# Patient Record
Sex: FEMALE | Race: WHITE | Employment: UNEMPLOYED | URBAN - METROPOLITAN AREA
[De-identification: names, ages, dates, MRNs, and addresses within clinical notes are randomized per-mention and may not be internally consistent; named-entity substitution may affect disease eponyms.]

---

## 2017-01-31 ENCOUNTER — OFFICE VISIT (OUTPATIENT)
Dept: ORTHOPEDIC SURGERY | Age: 57
End: 2017-01-31

## 2017-01-31 VITALS
WEIGHT: 117 LBS | RESPIRATION RATE: 15 BRPM | HEART RATE: 68 BPM | DIASTOLIC BLOOD PRESSURE: 71 MMHG | HEIGHT: 65 IN | SYSTOLIC BLOOD PRESSURE: 124 MMHG | BODY MASS INDEX: 19.49 KG/M2

## 2017-01-31 DIAGNOSIS — M79.672 BILATERAL FOOT PAIN: Primary | ICD-10-CM

## 2017-01-31 DIAGNOSIS — M21.612 BILATERAL BUNIONS: ICD-10-CM

## 2017-01-31 DIAGNOSIS — M79.671 BILATERAL FOOT PAIN: Primary | ICD-10-CM

## 2017-01-31 DIAGNOSIS — M21.611 BILATERAL BUNIONS: ICD-10-CM

## 2017-01-31 PROCEDURE — 99243 OFF/OP CNSLTJ NEW/EST LOW 30: CPT | Performed by: ORTHOPAEDIC SURGERY

## 2017-01-31 PROCEDURE — 73630 X-RAY EXAM OF FOOT: CPT | Performed by: ORTHOPAEDIC SURGERY

## 2017-02-01 ENCOUNTER — NURSE ONLY (OUTPATIENT)
Age: 57
End: 2017-02-01

## 2017-02-01 DIAGNOSIS — M81.0 OSTEOPOROSIS: Primary | ICD-10-CM

## 2017-02-01 PROCEDURE — 96372 THER/PROPH/DIAG INJ SC/IM: CPT | Performed by: INTERNAL MEDICINE

## 2017-02-08 ENCOUNTER — TELEPHONE (OUTPATIENT)
Dept: ENDOCRINOLOGY | Age: 57
End: 2017-02-08

## 2017-02-09 DIAGNOSIS — M81.0 OSTEOPOROSIS: Primary | ICD-10-CM

## 2017-02-10 ENCOUNTER — TELEPHONE (OUTPATIENT)
Dept: ENDOCRINOLOGY | Age: 57
End: 2017-02-10

## 2017-02-13 ENCOUNTER — TELEPHONE (OUTPATIENT)
Dept: ENDOCRINOLOGY | Age: 57
End: 2017-02-13

## 2017-03-02 ENCOUNTER — OFFICE VISIT (OUTPATIENT)
Dept: INTERNAL MEDICINE CLINIC | Age: 57
End: 2017-03-02

## 2017-03-02 VITALS
TEMPERATURE: 98.7 F | SYSTOLIC BLOOD PRESSURE: 122 MMHG | WEIGHT: 126 LBS | HEART RATE: 67 BPM | RESPIRATION RATE: 14 BRPM | OXYGEN SATURATION: 98 % | HEIGHT: 65 IN | DIASTOLIC BLOOD PRESSURE: 82 MMHG | BODY MASS INDEX: 20.99 KG/M2

## 2017-03-02 DIAGNOSIS — F41.9 ANXIETY: Primary | ICD-10-CM

## 2017-03-02 PROCEDURE — 99213 OFFICE O/P EST LOW 20 MIN: CPT | Performed by: INTERNAL MEDICINE

## 2017-03-02 RX ORDER — ZOLPIDEM TARTRATE 10 MG/1
TABLET ORAL
Qty: 30 TABLET | Refills: 2 | Status: SHIPPED | OUTPATIENT
Start: 2017-03-02 | End: 2017-09-07 | Stop reason: SDUPTHER

## 2017-03-02 RX ORDER — BUSPIRONE HYDROCHLORIDE 15 MG/1
15 TABLET ORAL 2 TIMES DAILY
Qty: 180 TABLET | Refills: 1 | Status: SHIPPED | OUTPATIENT
Start: 2017-03-02 | End: 2018-05-17 | Stop reason: SDUPTHER

## 2017-05-26 RX ORDER — ZOLPIDEM TARTRATE 10 MG/1
TABLET ORAL
Qty: 30 TABLET | Refills: 0 | OUTPATIENT
Start: 2017-05-26

## 2017-08-02 ENCOUNTER — TELEPHONE (OUTPATIENT)
Dept: ENDOCRINOLOGY | Age: 57
End: 2017-08-02

## 2017-08-03 ENCOUNTER — OFFICE VISIT (OUTPATIENT)
Dept: ENDOCRINOLOGY | Age: 57
End: 2017-08-03

## 2017-08-03 VITALS
SYSTOLIC BLOOD PRESSURE: 112 MMHG | DIASTOLIC BLOOD PRESSURE: 75 MMHG | OXYGEN SATURATION: 100 % | HEIGHT: 65 IN | HEART RATE: 68 BPM | BODY MASS INDEX: 20.26 KG/M2 | WEIGHT: 121.6 LBS | RESPIRATION RATE: 12 BRPM

## 2017-08-03 DIAGNOSIS — M81.0 OSTEOPOROSIS, UNSPECIFIED OSTEOPOROSIS TYPE, UNSPECIFIED PATHOLOGICAL FRACTURE PRESENCE: Primary | ICD-10-CM

## 2017-08-03 PROCEDURE — 99214 OFFICE O/P EST MOD 30 MIN: CPT | Performed by: INTERNAL MEDICINE

## 2017-08-03 PROCEDURE — 96372 THER/PROPH/DIAG INJ SC/IM: CPT | Performed by: INTERNAL MEDICINE

## 2017-09-07 ENCOUNTER — OFFICE VISIT (OUTPATIENT)
Dept: INTERNAL MEDICINE CLINIC | Age: 57
End: 2017-09-07

## 2017-09-07 VITALS
RESPIRATION RATE: 12 BRPM | HEIGHT: 65 IN | BODY MASS INDEX: 20.26 KG/M2 | OXYGEN SATURATION: 97 % | SYSTOLIC BLOOD PRESSURE: 110 MMHG | DIASTOLIC BLOOD PRESSURE: 60 MMHG | WEIGHT: 121.6 LBS | HEART RATE: 62 BPM | TEMPERATURE: 97.6 F

## 2017-09-07 DIAGNOSIS — R20.0 NUMBNESS AND TINGLING OF BOTH FEET: Primary | ICD-10-CM

## 2017-09-07 DIAGNOSIS — F51.02 ADJUSTMENT INSOMNIA: ICD-10-CM

## 2017-09-07 DIAGNOSIS — E78.00 PURE HYPERCHOLESTEROLEMIA: ICD-10-CM

## 2017-09-07 DIAGNOSIS — R20.0 NUMBNESS AND TINGLING OF BOTH FEET: ICD-10-CM

## 2017-09-07 DIAGNOSIS — R20.2 NUMBNESS AND TINGLING OF BOTH FEET: Primary | ICD-10-CM

## 2017-09-07 DIAGNOSIS — R20.2 NUMBNESS AND TINGLING OF BOTH FEET: ICD-10-CM

## 2017-09-07 LAB
A/G RATIO: 1.5 (ref 1.1–2.2)
ALBUMIN SERPL-MCNC: 4.5 G/DL (ref 3.4–5)
ALP BLD-CCNC: 39 U/L (ref 40–129)
ALT SERPL-CCNC: 8 U/L (ref 10–40)
ANION GAP SERPL CALCULATED.3IONS-SCNC: 17 MMOL/L (ref 3–16)
AST SERPL-CCNC: 19 U/L (ref 15–37)
BASOPHILS ABSOLUTE: 0.1 K/UL (ref 0–0.2)
BASOPHILS RELATIVE PERCENT: 0.8 %
BILIRUB SERPL-MCNC: 0.8 MG/DL (ref 0–1)
BUN BLDV-MCNC: 16 MG/DL (ref 7–20)
CALCIUM SERPL-MCNC: 9.3 MG/DL (ref 8.3–10.6)
CHLORIDE BLD-SCNC: 100 MMOL/L (ref 99–110)
CHOLESTEROL, TOTAL: 231 MG/DL (ref 0–199)
CO2: 23 MMOL/L (ref 21–32)
CREAT SERPL-MCNC: 0.5 MG/DL (ref 0.6–1.1)
EOSINOPHILS ABSOLUTE: 0.1 K/UL (ref 0–0.6)
EOSINOPHILS RELATIVE PERCENT: 1.2 %
GFR AFRICAN AMERICAN: >60
GFR NON-AFRICAN AMERICAN: >60
GLOBULIN: 3.1 G/DL
GLUCOSE BLD-MCNC: 88 MG/DL (ref 70–99)
HCT VFR BLD CALC: 42.7 % (ref 36–48)
HDLC SERPL-MCNC: 93 MG/DL (ref 40–60)
HEMOGLOBIN: 14.7 G/DL (ref 12–16)
LDL CHOLESTEROL CALCULATED: 128 MG/DL
LYMPHOCYTES ABSOLUTE: 1.1 K/UL (ref 1–5.1)
LYMPHOCYTES RELATIVE PERCENT: 15.1 %
MCH RBC QN AUTO: 31.9 PG (ref 26–34)
MCHC RBC AUTO-ENTMCNC: 34.3 G/DL (ref 31–36)
MCV RBC AUTO: 92.9 FL (ref 80–100)
MONOCYTES ABSOLUTE: 0.4 K/UL (ref 0–1.3)
MONOCYTES RELATIVE PERCENT: 5.2 %
NEUTROPHILS ABSOLUTE: 5.6 K/UL (ref 1.7–7.7)
NEUTROPHILS RELATIVE PERCENT: 77.7 %
PDW BLD-RTO: 13 % (ref 12.4–15.4)
PLATELET # BLD: 203 K/UL (ref 135–450)
PMV BLD AUTO: 7.8 FL (ref 5–10.5)
POTASSIUM SERPL-SCNC: 4.2 MMOL/L (ref 3.5–5.1)
RBC # BLD: 4.59 M/UL (ref 4–5.2)
SEDIMENTATION RATE, ERYTHROCYTE: 9 MM/HR (ref 0–30)
SODIUM BLD-SCNC: 140 MMOL/L (ref 136–145)
TOTAL PROTEIN: 7.6 G/DL (ref 6.4–8.2)
TRIGL SERPL-MCNC: 49 MG/DL (ref 0–150)
TSH SERPL DL<=0.05 MIU/L-ACNC: 1.78 UIU/ML (ref 0.27–4.2)
VLDLC SERPL CALC-MCNC: 10 MG/DL
WBC # BLD: 7.2 K/UL (ref 4–11)

## 2017-09-07 PROCEDURE — 99214 OFFICE O/P EST MOD 30 MIN: CPT | Performed by: INTERNAL MEDICINE

## 2017-09-07 RX ORDER — ZOLPIDEM TARTRATE 10 MG/1
TABLET ORAL
Qty: 30 TABLET | Refills: 2 | Status: SHIPPED | OUTPATIENT
Start: 2017-09-07 | End: 2018-05-04 | Stop reason: SDUPTHER

## 2017-09-07 ASSESSMENT — ENCOUNTER SYMPTOMS
VOMITING: 0
COUGH: 0
SHORTNESS OF BREATH: 0
NAUSEA: 0

## 2017-09-08 DIAGNOSIS — E53.8 B12 DEFICIENCY: Primary | ICD-10-CM

## 2017-09-08 LAB
ANA INTERPRETATION: NORMAL
ANTI-NUCLEAR ANTIBODY (ANA): NEGATIVE

## 2017-10-31 ENCOUNTER — OFFICE VISIT (OUTPATIENT)
Dept: INTERNAL MEDICINE CLINIC | Age: 57
End: 2017-10-31

## 2017-10-31 VITALS
BODY MASS INDEX: 20.19 KG/M2 | HEART RATE: 65 BPM | TEMPERATURE: 98 F | SYSTOLIC BLOOD PRESSURE: 124 MMHG | OXYGEN SATURATION: 98 % | DIASTOLIC BLOOD PRESSURE: 68 MMHG | WEIGHT: 121.2 LBS | HEIGHT: 65 IN | RESPIRATION RATE: 12 BRPM

## 2017-10-31 DIAGNOSIS — F41.9 ANXIETY: ICD-10-CM

## 2017-10-31 DIAGNOSIS — Z00.00 ANNUAL PHYSICAL EXAM: Primary | ICD-10-CM

## 2017-10-31 DIAGNOSIS — M79.672 FOOT PAIN, BILATERAL: ICD-10-CM

## 2017-10-31 DIAGNOSIS — M72.0 DUPUYTREN'S CONTRACTURE OF BOTH HANDS: ICD-10-CM

## 2017-10-31 DIAGNOSIS — M79.671 FOOT PAIN, BILATERAL: ICD-10-CM

## 2017-10-31 DIAGNOSIS — Z23 NEED FOR INFLUENZA VACCINATION: ICD-10-CM

## 2017-10-31 DIAGNOSIS — H35.52 RETINITIS PIGMENTOSA: ICD-10-CM

## 2017-10-31 DIAGNOSIS — F51.02 ADJUSTMENT INSOMNIA: ICD-10-CM

## 2017-10-31 LAB
BILIRUBIN, POC: ABNORMAL
BLOOD URINE, POC: ABNORMAL
CLARITY, POC: CLEAR
COLOR, POC: ABNORMAL
FOLATE: 8.8 NG/ML (ref 4.78–24.2)
GLUCOSE URINE, POC: ABNORMAL
KETONES, POC: ABNORMAL
LEUKOCYTE EST, POC: ABNORMAL
NITRITE, POC: ABNORMAL
PH, POC: 5.5
PROTEIN, POC: ABNORMAL
SPECIFIC GRAVITY, POC: 1.02
UROBILINOGEN, POC: 0.2
VITAMIN B-12: 584 PG/ML (ref 211–911)

## 2017-10-31 PROCEDURE — 81002 URINALYSIS NONAUTO W/O SCOPE: CPT | Performed by: INTERNAL MEDICINE

## 2017-10-31 PROCEDURE — 99396 PREV VISIT EST AGE 40-64: CPT | Performed by: INTERNAL MEDICINE

## 2017-10-31 PROCEDURE — 90630 INFLUENZA, QUADV, 18-64 YRS, ID, PF, MICRO INJ, 0.1ML (FLUZONE QUADV, PF): CPT | Performed by: INTERNAL MEDICINE

## 2017-10-31 PROCEDURE — 90471 IMMUNIZATION ADMIN: CPT | Performed by: INTERNAL MEDICINE

## 2017-10-31 RX ORDER — NAPROXEN 500 MG/1
500 TABLET ORAL
COMMUNITY
Start: 2017-10-16 | End: 2020-11-16 | Stop reason: ALTCHOICE

## 2017-10-31 RX ORDER — CALCIUM CARBONATE 500(1250)
600 TABLET ORAL DAILY
COMMUNITY
End: 2018-01-18

## 2017-10-31 ASSESSMENT — ENCOUNTER SYMPTOMS
ABDOMINAL PAIN: 0
NAUSEA: 0
BACK PAIN: 0
APNEA: 0
WHEEZING: 0
TROUBLE SWALLOWING: 0
SINUS PRESSURE: 0
SORE THROAT: 0
COLOR CHANGE: 0
COUGH: 0
VOMITING: 0
BLOOD IN STOOL: 0
ABDOMINAL DISTENTION: 0
RHINORRHEA: 0
EYE PAIN: 0
CHEST TIGHTNESS: 0
DIARRHEA: 0
VOICE CHANGE: 0
CONSTIPATION: 1
SHORTNESS OF BREATH: 0

## 2017-10-31 ASSESSMENT — PATIENT HEALTH QUESTIONNAIRE - PHQ9
SUM OF ALL RESPONSES TO PHQ QUESTIONS 1-9: 0
SUM OF ALL RESPONSES TO PHQ9 QUESTIONS 1 & 2: 0
2. FEELING DOWN, DEPRESSED OR HOPELESS: 0
1. LITTLE INTEREST OR PLEASURE IN DOING THINGS: 0

## 2017-10-31 NOTE — PROGRESS NOTES
Subjective:      Patient ID: Manju Rojas is a 60 y.o. female. HPI     Chief Complaint   Patient presents with    Annual Exam     labs done 17      cei doc - tx for RP with genetic therapy/stem cell possible    Lab discussed    No numbness but still some pain in feet, using inserts, seeing podiatrist  Watch  \"I can't go barefoot\"    Moved into condo    Uses the [place] every other night, buspar bid      Current Outpatient Prescriptions   Medication Sig Dispense Refill    naproxen (NAPROSYN) 500 MG tablet Take 500 mg by mouth      calcium carbonate (OSCAL) 500 MG TABS tablet Take 600 mg by mouth daily      zolpidem (AMBIEN) 10 MG tablet TAKE 1 TABLET BY MOUTH EVERY NIGHT AT BEDTIME AS NEEDED 30 tablet 2    busPIRone (BUSPAR) 15 MG tablet Take 1 tablet by mouth 2 times daily 180 tablet 1    latanoprost (XALATAN) 0.005 % ophthalmic solution Place  into both eyes daily.  Vitamin A 16161 UNITS TABS Take 15,000 Units by mouth.  Omega-3 Fatty Acids (FISH OIL) 1000 MG CAPS Take 1,200 mg by mouth daily       betamethasone dipropionate (DIPROLENE) 0.05 % cream as needed.  nystatin-triamcinolone (MYCOLOG II) 073448-4.1 UNIT/GM-% cream Apply  topically. 4     No current facility-administered medications for this visit. Allergies:   Allergies   Allergen Reactions    Penicillins      Prior Medical History:       Diagnosis Date    Depression     Retinopathy     WITH BLINDNESS PARTIAL, RETINITIS PIGMENTOSA     Social History  Social History   Substance Use Topics    Smoking status: Never Smoker    Smokeless tobacco: Never Used    Alcohol use 0.5 oz/week     1 Standard drinks or equivalent per week      Comment: rare     Prior Surgical History:      Procedure Laterality Date     SECTION      CYSTOSCOPY      FOR URETER STONE REMOVAL    HYSTERECTOMY      WITHOUT OOPHERECTOMY     Family History:       Problem Relation Age of Onset    Cancer Father      KIDNEY    Heart Disease Adjustment insomnia F51.02 307.41    6. Dupuytren's contracture of both hands M72.0 728.6    7. Foot pain, bilateral M79.671 729.5 Vitamin B12 & Folate    M79.672            Plan:      Annual exam - good for her    Vision - discussed    Anxiety - better    Insomnia - Controlled Substances Monitoring:     Attestation: The Prescription Monitoring Report for this patient was reviewed today. Sobia Kumari MD)  Documentation: Possible medication side effects, risk of tolerance and/or dependence, and alternative treatments discussed., No signs of potential drug abuse or diversion identified., Medication contract signed today.  Sobia Kumari MD)   Dupuytren's - discussed, watch, no sx    Foot pain - check b12

## 2017-10-31 NOTE — LETTER
supplements and oral decongestants. Dependence withdrawal symptoms may include depressed mood, loss of interest, suicidal thoughts, anxiety, fatigue, appetite changes and agitation. Testosterone replacement therapy:  Potential side effects include increased risk of stroke and heart attack, blood clots, increased blood pressure, increased cholesterol, enlarged prostate, sleep apnea, irritability/aggression and other mood disorders, and decreased fertility. Other:     1. I understand that I have the following responsibilities:  · I will take medications at the dose and frequency prescribed. · I will not increase or change how I take my medications without the approval of the health care provider who signs this Medication Agreement. · I will arrange for refills at the prescribed interval ONLY during regular office hours. I will not ask for refills earlier than agreed, after-hours, on holidays or on weekends. · I will obtain all refills for these medications at  ·  ____________________________________  pharmacy (phone number  ·  ________________________), with full consent for my provider and pharmacist to exchange information in writing or verbally. · I will not request any pain medications or controlled substances from other providers and will inform this provider of all other medications I am taking. · I will inform my other health care providers that I am taking these medications and of the existence of this [facility]. In the event of an emergency, I will provide the same information to the emergency department providers. · I will protect my prescriptions and medications. I understand that lost or misplaced prescriptions will not be replaced. · I will keep medications only for my own use and will not share them with others. I will keep all medications away from children. · I agree to participate in any medical, psychological or psychiatric assessments recommended by my provider. · I will actively participate in any program designed to improve function, including social, physical, psychological and daily or work activities. 2. I will not use illegal or street drugs or another person's prescription. If I have an addiction problem with drugs or alcohol and my provider asks me to enter a program to address this issue, I agree to follow through. Such programs may include:  · 12-Step program and securing a sponsor  · Individual counseling   · Inpatient or outpatient treatment  · Other:_____________________________________________________________________________________________________________________________________________    If in treatment, I will request that a copy of the programs initial evaluation and treatment recommendations be sent to this provider and will not expect refills until that is received. I will also request written monthly updates be sent to this provider to verify my continuing treatment. 3. I will consent to drug screening upon my providers request to assure I am only taking the prescribed drugs, described in this MEDICATION AGREEMENT. I understand that a drug screen is a laboratory test in which a sample of my urine, blood or saliva is checked to see what drugs I have been taking. 4. I agree that I will treat the providers and staff at this office with respect at all times. I will keep all of my scheduled appointments, but if I need to cancel my appointment, I will do so a minimum of 24 hours before it is scheduled. 5. I understand that this provider may stop prescribing the medications listed if:  · I do not show any improvement in pain, or my activity has not improved. · I develop rapid tolerance or loss of improvement, as described in my treatment plan. · I develop significant side effects from the medication.   · My behavior is inconsistent with the responsibilities outlined above, which may also result in my being prevented from receiving further care from this office. · Other:____________________________________________________________________    AGREEMENT:    I have read the above and have had all of my questions answered. For chronic disease management, I know that my symptoms can be managed with many types of treatments. A chronic medication trial may be part of my treatment, but I must be an active participant in my care. Medication therapy is only one part of my symptom management plan. In some cases, there may be limited scientific evidence to support the chronic use of certain medications to improve symptoms and daily function. Furthermore, in certain circumstances, there may be scientific information that suggests that use of chronic controlled substances may actually worsen my symptoms and increase my risk of unintentional death directly related to this medication therapy. I know that if my provider feels my risk from controlled medications is greater than my benefit, I will have my controlled substance medication(s) compassionately lowered or removed altogether. I agree to a controlled substance medication trial.      I further agree to allow this office to contact family or friends if there are concerns about my safety and use of the controlled medications. I have agreed to use the following medications above as instructed by my physician and as stated in this [facility] [address].      Patient Signature:  ______________________  Date:10/31/2017 or _____________    Provider Signature:______________________  Date:10/31/2017 or _____________

## 2017-10-31 NOTE — PATIENT INSTRUCTIONS
Patient Education          influenza virus vaccine (injection)  Pronunciation:  in tyler JHAK seen  Brand:  Afluria [id number], Afluria Preservative-Free 1159-9006, Fluad 4981-6546, Fluarix Quadrivalent 1697-3259, Flublok 9302-7388, Flucelvax 3879-8739, FluLaval Preservative-Free Quadrivalent 6096-7562, FluLaval Quadrivalent 0941-4993, Fluvirin 1326-0350, Fluvirin Preservative-Free 8594-9128, Fluzone High-Dose 0072-7417, Fluzone Preservative-Free Pediatric Quadrivalent 6939-8487, Fluzone Preservative-Free Quadrivalent 5257-7501, Fluzone Quadrivalent 5767-0082, Fluzone Quadrivalent Intradermal 2247-4791  What is the most important information I should know about this vaccine? The injectable influenza virus vaccine (flu shot) is a \"killed virus\" vaccine. Influenza virus vaccine is also available in a nasal spray form, which is a \"live virus\" vaccine. This medication guide addresses only the injectable form of this vaccine. Becoming infected with influenza is much more dangerous to your health than receiving this vaccine. However, like any medicine, this vaccine can cause side effects but the risk of serious side effects is extremely low. What is influenza virus vaccine? Influenza virus (commonly known as \"the flu\") is a serious disease caused by a virus. Influenza virus can spread from one person to another through small droplets of saliva that are expelled into the air when an infected person coughs or sneezes. The virus can also be passed through contact with objects the infected person has touched, such as a door handle or other surfaces. Influenza virus vaccine is used to prevent infection caused by influenza virus. The vaccine is redeveloped each year to contain specific strains of inactivated (killed) flu virus that are recommended by public health officials for that year. The injectable influenza virus vaccine (flu shot) is a \"killed virus\" vaccine.  Influenza virus vaccine is also available in a nasal spray form, which is a \"live virus\" vaccine. Influenza virus vaccine works by exposing you to a small dose of the virus, which helps your body to develop immunity to the disease. Influenza virus vaccine will not treat an active infection that has already developed in the body. Influenza virus vaccine is for use in adults and children who are at least 7 months old. Becoming infected with influenza is much more dangerous to your health than receiving this vaccine. Influenza causes thousands of deaths each year, and hundreds of thousands of hospitalizations. However, like any medicine, this vaccine can cause side effects but the risk of serious side effects is extremely low. Like any vaccine, influenza virus vaccine may not provide protection from disease in every person. This vaccine will not prevent illness caused by darell flu (\"bird flu\"). What should I discuss with my healthcare provider before receiving this vaccine? You may not be able to receive this vaccine if you are allergic to eggs, or if you have:  · a history of severe allergic reaction to a flu vaccine; or  · a history of Guillain-[place] syndrome (within 6 weeks after receiving a flu vaccine). To make sure influenza virus injectable vaccine is safe for you, tell your doctor if you have:  · a bleeding or blood clotting disorder such as hemophilia or easy bruising;  · a neurologic disorder or disease affecting the brain (or if this was a reaction to a previous vaccine);  · a history of seizures;  · a weak immune system caused by disease, bone marrow transplant, or by using certain medicines or receiving cancer treatments; or  · if you are allergic to latex rubber. You can still receive a vaccine if you have a minor cold. In the case of a more severe illness with a fever or any type of infection, wait until you get better before receiving this vaccine.   The Centers for Disease Control and Prevention recommends that pregnant women get a flu shot during any trimester of pregnancy to protect themselves and their  babies from flu. The nasal spray form of influenza vaccine is not recommended for use in pregnant women. It is not known whether influenza virus vaccine passes into breast milk or if it could harm a nursing baby. Do not receive this vaccine without telling your doctor if you are breast-feeding a baby. This vaccine should not be given to a child younger than 7 months old. How is this vaccine given? Some brands of this vaccine are made for use in adults and not in children. Your child's doctor can recommend the best influenza virus vaccine for your child. This vaccine is given as an injection (shot) into a muscle. You will receive this injection in a doctor's office or other clinic setting. You should receive a flu vaccine every year. Your immunity will gradually decrease over the 12 months after you receive the influenza virus vaccine. Children receiving this vaccine may need a booster shot one month after receiving the first vaccine. The influenza virus vaccine is usually given in October or November. Some people may need to have their vaccines earlier or later. Follow your doctor's instructions. Your doctor may recommend treating fever and pain with an aspirin-free pain reliever such as acetaminophen (Tylenol) or ibuprofen (Motrin, Advil, and others) when the shot is given and for the next 24 hours. Follow the label directions or your doctor's instructions about how much of this medicine to give your child. It is especially important to prevent fever from occurring in a child who has a seizure disorder such as epilepsy. What happens if I miss a dose? Since flu shots are usually given only one time per year, you will most likely not be on a dosing schedule. Call your doctor if you forget to receive your yearly flu shot in October or November.   If your child misses a booster dose of this vaccine, call your doctor for instructions. What happens if I overdose? An overdose of this vaccine is unlikely to occur. What should I avoid before or after receiving this vaccine? Follow your doctor's instructions about any restrictions on food, beverages, or activity. What are the possible side effects of influenza virus injectable vaccine? Influenza virus injectable (killed virus) vaccine will not cause you to become ill with the flu virus that it contains. However, you may have flu-like symptoms at any time during flu season that may be caused by other strains of influenza virus. You should not receive a booster vaccine if you had a life-threatening allergic reaction after the first shot. Keep track of any and all side effects you have after receiving this vaccine. If you ever need to receive influenza virus vaccine in the future, you will need to tell your doctor if the previous shot caused any side effects. Get emergency medical help if you have signs of an allergic reaction: hives; difficulty breathing; swelling of your face, lips, tongue, or throat. Call your doctor at once if you have:  · a light-headed feeling, like you might pass out;  · severe weakness or unusual feeling in your arms and legs (may occur 2 to 4 weeks after you receive the vaccine);  · high fever;  · seizure (convulsions); or  · unusual bleeding. Common side effects may include:  · low fever, chills;  · mild fussiness or crying;  · redness, bruising, pain, swelling, or a lump where the vaccine was injected;  · headache, tired feeling; or  · joint or muscle pain. This is not a complete list of side effects and others may occur. Call your doctor for medical advice about side effects. You may report vaccine side effects to the [place] [address] and Human Services at [phone]. What other drugs will affect influenza virus injectable vaccine?   Before receiving this vaccine, tell your doctor if you are using:  · phenytoin;  · theophylline; or  · a blood thinner such as warfarin, Coumadin. Also tell the doctor if you have recently received drugs or treatments that can weaken the immune system, including:  · an oral, nasal, inhaled, or injectable steroid medicine;  · medications to treat psoriasis, rheumatoid arthritis, or other autoimmune disorders--azathioprine, etanercept, leflunomide, and others; or  · medicines to treat or prevent organ transplant rejection--basiliximab, cyclosporine, muromonab-CD3, mycophenolate mofetil, sirolimus, tacrolimus. If you are using any of these medications, you may not be able to receive the vaccine, or may need to wait until the other treatments are finished. This list is not complete. Other drugs may affect influenza virus injectable vaccine, including prescription and over-the-counter medicines, vitamins, and herbal products. Not all possible interactions are listed in this medication guide. Where can I get more information? Your doctor or pharmacist can provide more information about this vaccine. Additional information is available from your local health department or the Centers for Disease Control and Prevention. Remember, keep this and all other medicines out of the reach of children, never share your medicines with others, and use this medication only for the indication prescribed. Every effort has been made to ensure that the information provided by [address] is accurate, up-to-date, and complete, but no guarantee is made to that effect. Drug information contained herein may be time sensitive. [facility] information has been compiled for use by healthcare practitioners and consumers in the [place] and therefore [employer] does not warrant that uses outside of the [place] are appropriate, unless specifically indicated otherwise. [facility]'s drug information does not endorse drugs, diagnose patients or recommend therapy.  [employer]'s drug information is an informational resource designed to assist licensed healthcare practitioners in caring for their patients and/or to serve consumers viewing this service as a supplement to, and not a substitute for, the expertise, skill, knowledge and judgment of healthcare practitioners. The absence of a warning for a given drug or drug combination in no way should be construed to indicate that the drug or drug combination is safe, effective or appropriate for any given patient. [facility] does not assume any responsibility for any aspect of healthcare administered with the aid of information [facility] provides. The information contained herein is not intended to cover all possible uses, directions, precautions, warnings, drug interactions, allergic reactions, or adverse effects. If you have questions about the drugs you are taking, check with your doctor, nurse or pharmacist.  Copyright 8312-4012 [address]: 7.10. Revision date: 1/16/2017. Care instructions adapted under license by [employer]. If you have questions about a medical condition or this instruction, always ask your healthcare professional. [place] [address] any warranty or liability for your use of this information. Please call if symptoms worsen or do not improve.

## 2017-11-07 RX ORDER — BUSPIRONE HYDROCHLORIDE 10 MG/1
TABLET ORAL
Qty: 180 TABLET | Refills: 0 | Status: SHIPPED | OUTPATIENT
Start: 2017-11-07 | End: 2018-01-11 | Stop reason: DRUGHIGH

## 2018-01-09 ENCOUNTER — OFFICE VISIT (OUTPATIENT)
Dept: ORTHOPEDIC SURGERY | Age: 58
End: 2018-01-09

## 2018-01-09 DIAGNOSIS — M21.611 BUNION, RIGHT FOOT: ICD-10-CM

## 2018-01-09 DIAGNOSIS — G57.62 MORTON'S NEUROMA OF SECOND INTERSPACE OF LEFT FOOT: Primary | ICD-10-CM

## 2018-01-09 PROCEDURE — 73630 X-RAY EXAM OF FOOT: CPT | Performed by: ORTHOPAEDIC SURGERY

## 2018-01-09 PROCEDURE — 99214 OFFICE O/P EST MOD 30 MIN: CPT | Performed by: ORTHOPAEDIC SURGERY

## 2018-01-09 PROCEDURE — G8420 CALC BMI NORM PARAMETERS: HCPCS | Performed by: ORTHOPAEDIC SURGERY

## 2018-01-09 PROCEDURE — 1036F TOBACCO NON-USER: CPT | Performed by: ORTHOPAEDIC SURGERY

## 2018-01-09 PROCEDURE — G8484 FLU IMMUNIZE NO ADMIN: HCPCS | Performed by: ORTHOPAEDIC SURGERY

## 2018-01-09 PROCEDURE — G8427 DOCREV CUR MEDS BY ELIG CLIN: HCPCS | Performed by: ORTHOPAEDIC SURGERY

## 2018-01-09 PROCEDURE — 3017F COLORECTAL CA SCREEN DOC REV: CPT | Performed by: ORTHOPAEDIC SURGERY

## 2018-01-09 PROCEDURE — 3014F SCREEN MAMMO DOC REV: CPT | Performed by: ORTHOPAEDIC SURGERY

## 2018-01-11 ENCOUNTER — OFFICE VISIT (OUTPATIENT)
Dept: INTERNAL MEDICINE CLINIC | Age: 58
End: 2018-01-11

## 2018-01-11 VITALS
HEART RATE: 74 BPM | WEIGHT: 121 LBS | SYSTOLIC BLOOD PRESSURE: 102 MMHG | DIASTOLIC BLOOD PRESSURE: 78 MMHG | BODY MASS INDEX: 20.16 KG/M2 | HEIGHT: 65 IN

## 2018-01-11 DIAGNOSIS — G57.62 MORTON'S NEUROMA OF LEFT FOOT: ICD-10-CM

## 2018-01-11 DIAGNOSIS — Z01.818 PREOPERATIVE EXAMINATION: Primary | ICD-10-CM

## 2018-01-11 PROCEDURE — 99213 OFFICE O/P EST LOW 20 MIN: CPT | Performed by: INTERNAL MEDICINE

## 2018-01-11 PROCEDURE — 3017F COLORECTAL CA SCREEN DOC REV: CPT | Performed by: INTERNAL MEDICINE

## 2018-01-11 PROCEDURE — G8484 FLU IMMUNIZE NO ADMIN: HCPCS | Performed by: INTERNAL MEDICINE

## 2018-01-11 PROCEDURE — G8420 CALC BMI NORM PARAMETERS: HCPCS | Performed by: INTERNAL MEDICINE

## 2018-01-11 PROCEDURE — 3014F SCREEN MAMMO DOC REV: CPT | Performed by: INTERNAL MEDICINE

## 2018-01-11 PROCEDURE — G8427 DOCREV CUR MEDS BY ELIG CLIN: HCPCS | Performed by: INTERNAL MEDICINE

## 2018-01-11 ASSESSMENT — ENCOUNTER SYMPTOMS
CONSTIPATION: 0
EYE PAIN: 0
NAUSEA: 0
CHEST TIGHTNESS: 0
BLOOD IN STOOL: 0
ABDOMINAL DISTENTION: 0
SHORTNESS OF BREATH: 0
DIARRHEA: 0
VOMITING: 0
SORE THROAT: 0
SINUS PRESSURE: 0
VOICE CHANGE: 0
ABDOMINAL PAIN: 0
WHEEZING: 0
BACK PAIN: 0
TROUBLE SWALLOWING: 0
COLOR CHANGE: 0
APNEA: 0
RHINORRHEA: 0
COUGH: 0

## 2018-01-14 VITALS — RESPIRATION RATE: 16 BRPM | BODY MASS INDEX: 20.16 KG/M2 | HEIGHT: 65 IN | WEIGHT: 121 LBS

## 2018-01-14 PROBLEM — M21.611 BUNION, RIGHT FOOT: Status: ACTIVE | Noted: 2018-01-14

## 2018-01-14 PROBLEM — G57.62 MORTON'S NEUROMA OF SECOND INTERSPACE OF LEFT FOOT: Status: ACTIVE | Noted: 2018-01-14

## 2018-01-14 NOTE — PROGRESS NOTES
CHIEF COMPLAINT:  1- Left forefoot pain/ 2nd web space Mcginnis's neuroma. 2- Right foot great toe pain/bunion. HISTORY:  Ms. Hendricks Phi 59 y.o.  female presents today for the first visit for evaluation of left forefoot pain which started 2017.  She is complaining of achy  pain. Pain is increase with standing and walking. Pain radiates to 2nd, 3rd toes, with mild numbness and tingling sensation. No other complaint. The patient is known to me for bilateral bunion. Past Medical History:   Diagnosis Date    Depression     Retinopathy     WITH BLINDNESS PARTIAL, RETINITIS PIGMENTOSA       Past Surgical History:   Procedure Laterality Date     SECTION      CYSTOSCOPY      FOR URETER STONE REMOVAL    HYSTERECTOMY      WITHOUT OOPHERECTOMY       Social History     Social History    Marital status:      Spouse name: N/A    Number of children: N/A    Years of education: N/A     Occupational History    Not on file. Social History Main Topics    Smoking status: Never Smoker    Smokeless tobacco: Never Used    Alcohol use 0.5 oz/week     1 Standard drinks or equivalent per week      Comment: rare    Drug use: No    Sexual activity: Yes     Partners: Male     Other Topics Concern    Not on file     Social History Narrative    No narrative on file       Family History   Problem Relation Age of Onset    Cancer Father      KIDNEY    Heart Disease Father      chf, defibrillator    Other Father      macular degeneration    High Blood Pressure Father     Cancer Paternal Grandmother      type unknown    Osteoporosis Mother     High Blood Pressure Mother        Current Outpatient Prescriptions on File Prior to Visit   Medication Sig Dispense Refill    naproxen (NAPROSYN) 500 MG tablet Take 500 mg by mouth      busPIRone (BUSPAR) 15 MG tablet Take 1 tablet by mouth 2 times daily 180 tablet 1    latanoprost (XALATAN) 0.005 % ophthalmic solution Place  into both eyes daily.  Vitamin A 63745 UNITS TABS Take 15,000 Units by mouth.  Omega-3 Fatty Acids (FISH OIL) 1000 MG CAPS Take 1,200 mg by mouth daily       calcium carbonate (OSCAL) 500 MG TABS tablet Take 600 mg by mouth daily      zolpidem (AMBIEN) 10 MG tablet TAKE 1 TABLET BY MOUTH EVERY NIGHT AT BEDTIME AS NEEDED 30 tablet 2    betamethasone dipropionate (DIPROLENE) 0.05 % cream as needed.  nystatin-triamcinolone (MYCOLOG II) 323066-7.1 UNIT/GM-% cream Apply  topically. 4     No current facility-administered medications on file prior to visit. Pertinent items are noted in HPI  Review of systems reviewed from Patient History Form dated on 1/9/2018 and available in the patient's chart under the Media tab. No change noted. PHYSICAL EXAMINATION:  Ms. Goldie Vincent is a very pleasant 59 y.o.  female who presents today in no acute distress, awake, alert, and oriented. She is well dressed, nourished and  groomed. Patient with normal affect. Height is  5' 5\" (1.651 m), weight is 121 lb (54.9 kg), Body mass index is 20.14 kg/m². Resting respiratory rate is 16. Examination of the gait, showed that the patient walks heel-toe with a non-antalgic gait and no limp.  Examination of both ankles showing a good range of motion.  She has dorsiflexion to about 10 degrees bilaterally, which increased with knee flexion. She has intact sensation and good pedal pulses.  She has good strength in all four planes, including eversion, and has mild tenderness on deep palpation over the left 2nd web space, with Wanda's click compared to the other side. There is right foot bunion with tenderness over medial eminence. The ankles are stable to drawer test bilaterally, ankle reflex 1+ equally bilaterally.       IMAGING: Xray's were reviewed.  3 views of the left foot taken in office today, and showed no acute fracture. No other abnormality.     X-rays were taken in the office today, 3 views of the right foot, and showed

## 2018-01-18 ENCOUNTER — HOSPITAL ENCOUNTER (OUTPATIENT)
Dept: SURGERY | Age: 58
Discharge: HOME | End: 2018-01-18
Attending: ORTHOPAEDIC SURGERY | Admitting: ORTHOPAEDIC SURGERY

## 2018-01-18 VITALS
BODY MASS INDEX: 19.97 KG/M2 | TEMPERATURE: 97.8 F | SYSTOLIC BLOOD PRESSURE: 127 MMHG | HEART RATE: 68 BPM | RESPIRATION RATE: 16 BRPM | OXYGEN SATURATION: 100 % | WEIGHT: 124.25 LBS | HEIGHT: 66 IN | DIASTOLIC BLOOD PRESSURE: 78 MMHG

## 2018-01-18 DIAGNOSIS — G57.62 MORTON'S NEUROMA OF SECOND INTERSPACE OF LEFT FOOT: Primary | ICD-10-CM

## 2018-01-18 PROCEDURE — 28080 REMOVAL OF FOOT LESION: CPT | Performed by: ORTHOPAEDIC SURGERY

## 2018-01-18 RX ORDER — HYDROMORPHONE HCL 110MG/55ML
0.5 PATIENT CONTROLLED ANALGESIA SYRINGE INTRAVENOUS EVERY 5 MIN PRN
Status: DISCONTINUED | OUTPATIENT
Start: 2018-01-18 | End: 2018-01-19 | Stop reason: HOSPADM

## 2018-01-18 RX ORDER — CEPHALEXIN 250 MG/1
500 CAPSULE ORAL 4 TIMES DAILY
Qty: 12 CAPSULE | Refills: 0 | Status: SHIPPED | OUTPATIENT
Start: 2018-01-18 | End: 2018-01-21

## 2018-01-18 RX ORDER — LIDOCAINE HYDROCHLORIDE 10 MG/ML
1 INJECTION, SOLUTION EPIDURAL; INFILTRATION; INTRACAUDAL; PERINEURAL
Status: ACTIVE | OUTPATIENT
Start: 2018-01-18 | End: 2018-01-18

## 2018-01-18 RX ORDER — ONDANSETRON 2 MG/ML
4 INJECTION INTRAMUSCULAR; INTRAVENOUS
Status: ACTIVE | OUTPATIENT
Start: 2018-01-18 | End: 2018-01-18

## 2018-01-18 RX ORDER — ONDANSETRON 2 MG/ML
4 INJECTION INTRAMUSCULAR; INTRAVENOUS PRN
Status: DISCONTINUED | OUTPATIENT
Start: 2018-01-18 | End: 2018-01-19 | Stop reason: HOSPADM

## 2018-01-18 RX ORDER — OXYCODONE HYDROCHLORIDE AND ACETAMINOPHEN 5; 325 MG/1; MG/1
1 TABLET ORAL EVERY 6 HOURS PRN
Qty: 20 TABLET | Refills: 0 | Status: SHIPPED | OUTPATIENT
Start: 2018-01-18 | End: 2018-01-25

## 2018-01-18 RX ORDER — FENTANYL CITRATE 50 UG/ML
50 INJECTION, SOLUTION INTRAMUSCULAR; INTRAVENOUS EVERY 5 MIN PRN
Status: DISCONTINUED | OUTPATIENT
Start: 2018-01-18 | End: 2018-01-19 | Stop reason: HOSPADM

## 2018-01-18 RX ORDER — OXYCODONE HYDROCHLORIDE AND ACETAMINOPHEN 5; 325 MG/1; MG/1
1 TABLET ORAL
Status: COMPLETED | OUTPATIENT
Start: 2018-01-18 | End: 2018-01-18

## 2018-01-18 RX ORDER — CEFAZOLIN SODIUM 2 G/100ML
2 INJECTION, SOLUTION INTRAVENOUS
Status: COMPLETED | OUTPATIENT
Start: 2018-01-18 | End: 2018-01-18

## 2018-01-18 RX ORDER — HYDROMORPHONE HCL 110MG/55ML
0.25 PATIENT CONTROLLED ANALGESIA SYRINGE INTRAVENOUS EVERY 5 MIN PRN
Status: DISCONTINUED | OUTPATIENT
Start: 2018-01-18 | End: 2018-01-19 | Stop reason: HOSPADM

## 2018-01-18 RX ORDER — SODIUM CHLORIDE 0.9 % (FLUSH) 0.9 %
10 SYRINGE (ML) INJECTION PRN
Status: DISCONTINUED | OUTPATIENT
Start: 2018-01-18 | End: 2018-01-19 | Stop reason: HOSPADM

## 2018-01-18 RX ORDER — SODIUM CHLORIDE 9 MG/ML
INJECTION, SOLUTION INTRAVENOUS CONTINUOUS
Status: DISCONTINUED | OUTPATIENT
Start: 2018-01-18 | End: 2018-01-19 | Stop reason: HOSPADM

## 2018-01-18 RX ORDER — MEPERIDINE HYDROCHLORIDE 25 MG/ML
12.5 INJECTION INTRAMUSCULAR; INTRAVENOUS; SUBCUTANEOUS EVERY 5 MIN PRN
Status: DISCONTINUED | OUTPATIENT
Start: 2018-01-18 | End: 2018-01-19 | Stop reason: HOSPADM

## 2018-01-18 RX ORDER — SODIUM CHLORIDE 0.9 % (FLUSH) 0.9 %
10 SYRINGE (ML) INJECTION EVERY 12 HOURS SCHEDULED
Status: DISCONTINUED | OUTPATIENT
Start: 2018-01-18 | End: 2018-01-19 | Stop reason: HOSPADM

## 2018-01-18 RX ORDER — FENTANYL CITRATE 50 UG/ML
25 INJECTION, SOLUTION INTRAMUSCULAR; INTRAVENOUS EVERY 5 MIN PRN
Status: DISCONTINUED | OUTPATIENT
Start: 2018-01-18 | End: 2018-01-19 | Stop reason: HOSPADM

## 2018-01-18 RX ORDER — OXYCODONE HYDROCHLORIDE AND ACETAMINOPHEN 5; 325 MG/1; MG/1
TABLET ORAL
Status: COMPLETED
Start: 2018-01-18 | End: 2018-01-18

## 2018-01-18 RX ADMIN — OXYCODONE HYDROCHLORIDE AND ACETAMINOPHEN 1 TABLET: 5; 325 TABLET ORAL at 08:25

## 2018-01-18 RX ADMIN — CEFAZOLIN SODIUM 2 G: 2 INJECTION, SOLUTION INTRAVENOUS at 07:05

## 2018-01-18 ASSESSMENT — PAIN SCALES - GENERAL
PAINLEVEL_OUTOF10: 5
PAINLEVEL_OUTOF10: 3

## 2018-01-18 ASSESSMENT — PAIN DESCRIPTION - PAIN TYPE
TYPE: SURGICAL PAIN
TYPE: SURGICAL PAIN

## 2018-01-18 ASSESSMENT — PAIN - FUNCTIONAL ASSESSMENT: PAIN_FUNCTIONAL_ASSESSMENT: 0-10

## 2018-01-18 ASSESSMENT — PAIN DESCRIPTION - DESCRIPTORS: DESCRIPTORS: ACHING

## 2018-01-18 NOTE — ANESTHESIA PRE-OP
needed. 9/4/14   Historical Provider, MD   nystatin-triamcinolone (MYCOLOG II) 273647-0.1 UNIT/GM-% cream Apply  topically. 10/6/14   Historical Provider, MD   Vitamin A 67806 UNITS TABS Take 15,000 Units by mouth. Historical Provider, MD   Omega-3 Fatty Acids (FISH OIL) 1000 MG CAPS Take 1,200 mg by mouth daily     Historical Provider, MD       Current Outpatient Prescriptions   Medication Sig Dispense Refill    naproxen (NAPROSYN) 500 MG tablet Take 500 mg by mouth      calcium carbonate (OSCAL) 500 MG TABS tablet Take 600 mg by mouth daily      zolpidem (AMBIEN) 10 MG tablet TAKE 1 TABLET BY MOUTH EVERY NIGHT AT BEDTIME AS NEEDED 30 tablet 2    busPIRone (BUSPAR) 15 MG tablet Take 1 tablet by mouth 2 times daily 180 tablet 1    latanoprost (XALATAN) 0.005 % ophthalmic solution Place  into both eyes daily.  betamethasone dipropionate (DIPROLENE) 0.05 % cream as needed.  nystatin-triamcinolone (MYCOLOG II) 901626-3.1 UNIT/GM-% cream Apply  topically. 4    Vitamin A 17303 UNITS TABS Take 15,000 Units by mouth.  Omega-3 Fatty Acids (FISH OIL) 1000 MG CAPS Take 1,200 mg by mouth daily        Current Facility-Administered Medications   Medication Dose Route Frequency Provider Last Rate Last Dose    ceFAZolin (ANCEF) 2 g in dextrose 4 % 100 mL IVPB (premix)  2 g Intravenous On Call to [id number] Franklyn Alvarez MD           Vital Signs  (Current) There were no vitals filed for this visit.   (for past 48 hrs)  No Data Recorded  (last three values)   BP Readings from Last 3 Encounters:   01/11/18 102/78   10/31/17 124/68   09/07/17 110/60       CBC  Lab Results   Component Value Date    WBC 7.2 09/07/2017    RBC 4.59 09/07/2017    HGB 14.7 09/07/2017    HCT 42.7 09/07/2017    MCV 92.9 09/07/2017    RDW 13.0 09/07/2017     09/07/2017       CMP    Lab Results   Component Value Date     09/07/2017    K 4.2 09/07/2017     09/07/2017    CO2 23 09/07/2017    BUN 16 09/07/2017    CREATININE Anesthesia Plan      general     ASA 2     (Plan for GETA with standard ASA monitoring. Additional monitoring as dictated by intra-operative course. Patient appropriately NPO for the procedure. Risk/Benefits reviewed with patient and all anesthetic questions answered prior to procedure.  )  Induction: intravenous. MIPS: Postoperative opioids intended and Prophylactic antiemetics administered. Anesthetic plan and risks discussed with patient. Plan discussed with CRNA. DOS STAFF ADDENDUM:    Pt seen and examined, chart reviewed (including anesthesia, drug and allergy history). No interval changes to history and physical examination. Anesthetic plan, risks, benefits, alternatives, and personnel involved discussed with patient. Patient verbalized an understanding and agrees to proceed.       Klaudia Mead DO  January 18, 2018  6:16 AM

## 2018-01-18 NOTE — PROGRESS NOTES
Phase 2 - awake,consuming po intake,pain pill given for moderate surgical pain,spouse at bedside,vss.

## 2018-02-01 ENCOUNTER — OFFICE VISIT (OUTPATIENT)
Dept: ORTHOPEDIC SURGERY | Age: 58
End: 2018-02-01

## 2018-02-01 VITALS
DIASTOLIC BLOOD PRESSURE: 63 MMHG | HEART RATE: 72 BPM | WEIGHT: 124 LBS | RESPIRATION RATE: 16 BRPM | HEIGHT: 66 IN | SYSTOLIC BLOOD PRESSURE: 117 MMHG | BODY MASS INDEX: 19.93 KG/M2

## 2018-02-01 DIAGNOSIS — G57.62 MORTON'S NEUROMA OF SECOND INTERSPACE OF LEFT FOOT: Primary | ICD-10-CM

## 2018-02-01 PROCEDURE — 99024 POSTOP FOLLOW-UP VISIT: CPT | Performed by: NURSE PRACTITIONER

## 2018-02-02 ENCOUNTER — TELEPHONE (OUTPATIENT)
Dept: ENDOCRINOLOGY | Age: 58
End: 2018-02-02

## 2018-02-05 ENCOUNTER — TELEPHONE (OUTPATIENT)
Dept: ORTHOPEDIC SURGERY | Age: 58
End: 2018-02-05

## 2018-02-05 DIAGNOSIS — M81.0 POSTMENOPAUSAL OSTEOPOROSIS: Primary | ICD-10-CM

## 2018-02-05 NOTE — TELEPHONE ENCOUNTER
Patient calling to speak to someone regarding some concerns she has. She will not give any other information as to what it is regarding.      Please call to discuss  [phone]

## 2018-02-06 NOTE — TELEPHONE ENCOUNTER
Spoke with patient and advised to hold off on the cold laser therapy, she also wanted to come in at 4 week follow up, this appointment was made for 2 weeks earlier per patient request.

## 2018-02-08 ENCOUNTER — NURSE ONLY (OUTPATIENT)
Age: 58
End: 2018-02-08

## 2018-02-08 DIAGNOSIS — M81.0 POSTMENOPAUSAL OSTEOPOROSIS: Primary | ICD-10-CM

## 2018-02-08 PROCEDURE — 96372 THER/PROPH/DIAG INJ SC/IM: CPT | Performed by: INTERNAL MEDICINE

## 2018-02-20 ENCOUNTER — TELEPHONE (OUTPATIENT)
Dept: ORTHOPEDIC SURGERY | Age: 58
End: 2018-02-20

## 2018-03-01 ENCOUNTER — OFFICE VISIT (OUTPATIENT)
Dept: ORTHOPEDIC SURGERY | Age: 58
End: 2018-03-01

## 2018-03-01 VITALS
BODY MASS INDEX: 19.93 KG/M2 | RESPIRATION RATE: 16 BRPM | HEIGHT: 66 IN | WEIGHT: 124 LBS | DIASTOLIC BLOOD PRESSURE: 84 MMHG | SYSTOLIC BLOOD PRESSURE: 133 MMHG | HEART RATE: 65 BPM

## 2018-03-01 DIAGNOSIS — G57.62 MORTON'S NEUROMA OF SECOND INTERSPACE OF LEFT FOOT: Primary | ICD-10-CM

## 2018-03-01 PROCEDURE — 99024 POSTOP FOLLOW-UP VISIT: CPT | Performed by: ORTHOPAEDIC SURGERY

## 2018-05-04 ENCOUNTER — TELEPHONE (OUTPATIENT)
Dept: INTERNAL MEDICINE CLINIC | Age: 58
End: 2018-05-04

## 2018-05-04 RX ORDER — ZOLPIDEM TARTRATE 10 MG/1
TABLET ORAL
Qty: 30 TABLET | Refills: 0 | Status: SHIPPED | OUTPATIENT
Start: 2018-05-04 | End: 2018-05-08 | Stop reason: SDUPTHER

## 2018-05-08 ENCOUNTER — TELEPHONE (OUTPATIENT)
Dept: INTERNAL MEDICINE CLINIC | Age: 58
End: 2018-05-08

## 2018-05-08 RX ORDER — ZOLPIDEM TARTRATE 10 MG/1
TABLET ORAL
Qty: 15 TABLET | Refills: 0 | Status: SHIPPED | OUTPATIENT
Start: 2018-05-08 | End: 2018-08-20 | Stop reason: SDUPTHER

## 2018-05-17 ENCOUNTER — OFFICE VISIT (OUTPATIENT)
Dept: ORTHOPEDIC SURGERY | Age: 58
End: 2018-05-17

## 2018-05-17 VITALS
DIASTOLIC BLOOD PRESSURE: 70 MMHG | RESPIRATION RATE: 16 BRPM | BODY MASS INDEX: 19.93 KG/M2 | WEIGHT: 124 LBS | SYSTOLIC BLOOD PRESSURE: 109 MMHG | HEIGHT: 66 IN | HEART RATE: 74 BPM

## 2018-05-17 DIAGNOSIS — G57.61 MORTON'S NEUROMA OF SECOND INTERSPACE OF RIGHT FOOT: Primary | ICD-10-CM

## 2018-05-17 DIAGNOSIS — M79.671 RIGHT FOOT PAIN: ICD-10-CM

## 2018-05-17 PROCEDURE — G8427 DOCREV CUR MEDS BY ELIG CLIN: HCPCS | Performed by: ORTHOPAEDIC SURGERY

## 2018-05-17 PROCEDURE — 1036F TOBACCO NON-USER: CPT | Performed by: ORTHOPAEDIC SURGERY

## 2018-05-17 PROCEDURE — G8420 CALC BMI NORM PARAMETERS: HCPCS | Performed by: ORTHOPAEDIC SURGERY

## 2018-05-17 PROCEDURE — 3017F COLORECTAL CA SCREEN DOC REV: CPT | Performed by: ORTHOPAEDIC SURGERY

## 2018-05-17 PROCEDURE — 64450 NJX AA&/STRD OTHER PN/BRANCH: CPT | Performed by: ORTHOPAEDIC SURGERY

## 2018-05-17 PROCEDURE — 99214 OFFICE O/P EST MOD 30 MIN: CPT | Performed by: ORTHOPAEDIC SURGERY

## 2018-05-17 RX ORDER — BUSPIRONE HYDROCHLORIDE 15 MG/1
TABLET ORAL
Qty: 180 TABLET | Refills: 0 | Status: SHIPPED | OUTPATIENT
Start: 2018-05-17 | End: 2018-08-02 | Stop reason: SDUPTHER

## 2018-06-04 ENCOUNTER — TELEPHONE (OUTPATIENT)
Dept: ORTHOPEDIC SURGERY | Age: 58
End: 2018-06-04

## 2018-06-04 DIAGNOSIS — M79.671 RIGHT FOOT PAIN: Primary | ICD-10-CM

## 2018-06-05 RX ORDER — DEXAMETHASONE SODIUM PHOSPHATE 4 MG/ML
INJECTION, SOLUTION INTRA-ARTICULAR; INTRALESIONAL; INTRAMUSCULAR; INTRAVENOUS; SOFT TISSUE
Qty: 30 ML | Refills: 0 | Status: SHIPPED | OUTPATIENT
Start: 2018-06-05 | End: 2020-11-16 | Stop reason: ALTCHOICE

## 2018-07-30 ENCOUNTER — TELEPHONE (OUTPATIENT)
Dept: ENDOCRINOLOGY | Age: 58
End: 2018-07-30

## 2018-08-08 RX ORDER — BUSPIRONE HYDROCHLORIDE 15 MG/1
TABLET ORAL
Qty: 180 TABLET | Refills: 0 | Status: SHIPPED | OUTPATIENT
Start: 2018-08-08 | End: 2018-08-20 | Stop reason: SDUPTHER

## 2018-08-20 ENCOUNTER — OFFICE VISIT (OUTPATIENT)
Dept: INTERNAL MEDICINE CLINIC | Age: 58
End: 2018-08-20

## 2018-08-20 VITALS
BODY MASS INDEX: 19.86 KG/M2 | TEMPERATURE: 98.5 F | HEIGHT: 66 IN | SYSTOLIC BLOOD PRESSURE: 116 MMHG | OXYGEN SATURATION: 99 % | HEART RATE: 71 BPM | WEIGHT: 123.6 LBS | DIASTOLIC BLOOD PRESSURE: 72 MMHG

## 2018-08-20 DIAGNOSIS — F51.02 ADJUSTMENT INSOMNIA: ICD-10-CM

## 2018-08-20 DIAGNOSIS — F41.9 ANXIETY: Primary | ICD-10-CM

## 2018-08-20 PROCEDURE — G8427 DOCREV CUR MEDS BY ELIG CLIN: HCPCS | Performed by: INTERNAL MEDICINE

## 2018-08-20 PROCEDURE — 1036F TOBACCO NON-USER: CPT | Performed by: INTERNAL MEDICINE

## 2018-08-20 PROCEDURE — 99213 OFFICE O/P EST LOW 20 MIN: CPT | Performed by: INTERNAL MEDICINE

## 2018-08-20 PROCEDURE — G8420 CALC BMI NORM PARAMETERS: HCPCS | Performed by: INTERNAL MEDICINE

## 2018-08-20 PROCEDURE — 3017F COLORECTAL CA SCREEN DOC REV: CPT | Performed by: INTERNAL MEDICINE

## 2018-08-20 RX ORDER — BETAMETHASONE DIPROPIONATE 0.5 MG/G
CREAM TOPICAL
Qty: 45 G | Refills: 0 | Status: SHIPPED | OUTPATIENT
Start: 2018-08-20 | End: 2019-02-28 | Stop reason: SDUPTHER

## 2018-08-20 RX ORDER — ZOLPIDEM TARTRATE 10 MG/1
TABLET ORAL
Qty: 30 TABLET | Refills: 2 | Status: SHIPPED | OUTPATIENT
Start: 2018-08-20 | End: 2019-02-28 | Stop reason: SDUPTHER

## 2018-08-20 RX ORDER — BUSPIRONE HYDROCHLORIDE 15 MG/1
15 TABLET ORAL 3 TIMES DAILY
Qty: 270 TABLET | Refills: 1 | Status: SHIPPED | OUTPATIENT
Start: 2018-08-20 | End: 2019-02-28 | Stop reason: SDUPTHER

## 2018-08-20 ASSESSMENT — ENCOUNTER SYMPTOMS
EYE PAIN: 0
GASTROINTESTINAL NEGATIVE: 1

## 2018-08-20 NOTE — PROGRESS NOTES
2018     Mc Turner (:  1960) is a 59 y.o. female, here for evaluation of the following medical concerns:    Chief Complaint   Patient presents with    Anxiety    Insomnia      HPI    Moved to [facility] - can walk to stores  Change is hard  Anxiety ok - still feels anxious at time  Discussed a lot with a friend  Still using ambien prn  Not suicidal  Doesn't like to be by herself   travels a lot  Discussed frustration  Has many positive things in her life      Review of Systems   Constitutional: Negative for chills, fever and unexpected weight change. Eyes: Positive for visual disturbance. Negative for pain. Gastrointestinal: Negative. Psychiatric/Behavioral: Positive for sleep disturbance. Negative for dysphoric mood and suicidal ideas. The patient is nervous/anxious. Prior to Visit Medications    Medication Sig Taking? Authorizing Provider   busPIRone (BUSPAR) 15 MG tablet TAKE 1 TABLET BY MOUTH TWICE DAILY Yes Nito Castellanos MD   dexamethasone (DECADRON) 4 MG/ML injection To be used with physical therapy Yes Valentine Burgos MD   naproxen (NAPROSYN) 500 MG tablet Take 500 mg by mouth Yes Historical Provider, MD   latanoprost (XALATAN) 0.005 % ophthalmic solution Place  into both eyes daily. Yes Historical Provider, MD   betamethasone dipropionate (DIPROLENE) 0.05 % cream as needed. Yes Historical Provider, MD   nystatin-triamcinolone (MYCOLOG II) 444849-3.2 UNIT/GM-% cream Apply  topically. Yes Historical Provider, MD   Vitamin A 40098 UNITS TABS Take 15,000 Units by mouth.    Yes Historical Provider, MD   Omega-3 Fatty Acids (FISH OIL) 1000 MG CAPS Take 1,200 mg by mouth daily  Yes Historical Provider, MD        Social History   Substance Use Topics    Smoking status: Never Smoker    Smokeless tobacco: Never Used    Alcohol use 1.2 oz/week     1 Standard drinks or equivalent, 1 Glasses of wine per week        Vitals:    18 0956   BP: 116/72   Site: Right Arm   Position: Sitting   Cuff Size: Medium Adult   Pulse: 71   Temp: 98.5 °F (36.9 °C)   TempSrc: Oral   SpO2: 99%   Weight: 123 lb 9.6 oz (56.1 kg)   Height: 5' 6\" (1.676 m)     Estimated body mass index is 19.95 kg/m² as calculated from the following:    Height as of this encounter: 5' 6\" (1.676 m). Weight as of this encounter: 123 lb 9.6 oz (56.1 kg). Physical Exam   Constitutional: She is oriented to person, place, and time. She appears well-developed and well-nourished. Neurological: She is alert and oriented to person, place, and time. Psychiatric: She has a normal mood and affect. Her behavior is normal. Judgment and thought content normal.       ASSESSMENT/PLAN:  1. Anxiety  Change to buspar to 15 mg tid  side effects of the medication were discussed   If no help, let me know    2. Adjustment insomnia  Controlled Substances Monitoring:     RX Monitoring 8/20/2018   Attestation The Prescription Monitoring Report for this patient was reviewed today. Documentation -   Medication Contracts Existing medication contract. No Follow-up on file. An electronic signature was used to authenticate this note.     --Kelvin Mancera MD on 8/20/2018 at 10:44 AM

## 2018-08-21 ENCOUNTER — OFFICE VISIT (OUTPATIENT)
Dept: ORTHOPEDIC SURGERY | Age: 58
End: 2018-08-21

## 2018-08-21 VITALS — HEIGHT: 66 IN | RESPIRATION RATE: 16 BRPM | WEIGHT: 123 LBS | BODY MASS INDEX: 19.77 KG/M2

## 2018-08-21 DIAGNOSIS — G57.62 MORTON'S NEUROMA OF SECOND INTERSPACE OF LEFT FOOT: Primary | ICD-10-CM

## 2018-08-21 DIAGNOSIS — G57.61 MORTON'S NEUROMA OF SECOND INTERSPACE OF RIGHT FOOT: ICD-10-CM

## 2018-08-21 PROCEDURE — 99213 OFFICE O/P EST LOW 20 MIN: CPT | Performed by: ORTHOPAEDIC SURGERY

## 2018-08-21 PROCEDURE — G8427 DOCREV CUR MEDS BY ELIG CLIN: HCPCS | Performed by: ORTHOPAEDIC SURGERY

## 2018-08-21 PROCEDURE — G8420 CALC BMI NORM PARAMETERS: HCPCS | Performed by: ORTHOPAEDIC SURGERY

## 2018-08-21 PROCEDURE — 1036F TOBACCO NON-USER: CPT | Performed by: ORTHOPAEDIC SURGERY

## 2018-08-21 PROCEDURE — 3017F COLORECTAL CA SCREEN DOC REV: CPT | Performed by: ORTHOPAEDIC SURGERY

## 2018-08-21 RX ORDER — DEXAMETHASONE SODIUM PHOSPHATE 4 MG/ML
INJECTION, SOLUTION INTRA-ARTICULAR; INTRALESIONAL; INTRAMUSCULAR; INTRAVENOUS; SOFT TISSUE
Qty: 30 ML | Refills: 0 | Status: SHIPPED | OUTPATIENT
Start: 2018-08-21 | End: 2018-08-30 | Stop reason: SDUPTHER

## 2018-08-21 NOTE — PROGRESS NOTES
CHIEF COMPLAINT: Right forefoot pain/ 2nd web space Mcginnis's neuroma. HISTORY:  Ms. Bridget March 59 y.o.  female presents today for evaluation of right forefoot pain which started 2018.  She continues to complain of achy stabbing pain in her right forefoot 2nd webspace. She had a cortisone injection on 2018 with a couple months of relief. Denies any pain with rest. Rates pain a 6/10 VAS with any activity. Pain is increase with standing and walking. Pain radiates to 2nd, 3rd toes, with mild numbness and tingling sensation. She is finding it difficult to walk more than 2-3 miles before she has to stop due to pain. She is still c/o achy burning pain left forefoot. No other complaint. The patient is known to me for left foot 2nd webspace Mortons's neuroma excision, 2018. Past Medical History:   Diagnosis Date    Depression     Retinopathy     WITH BLINDNESS PARTIAL, RETINITIS PIGMENTOSA       Past Surgical History:   Procedure Laterality Date     SECTION      CYSTOSCOPY      FOR URETER STONE REMOVAL    FOOT NEUROMA SURGERY Left 2018    LEFT FOOT SECOND WEB SPACE MORTONS NEUROMA EXCISION    FOOT SURGERY Left 2018    LEFT FOOT SECOND WEB SPACE MORTONS NEUROMA EXCISION    HYSTERECTOMY      WITHOUT OOPHERECTOMY       Social History     Social History    Marital status:      Spouse name: N/A    Number of children: N/A    Years of education: N/A     Occupational History    Not on file.      Social History Main Topics    Smoking status: Never Smoker    Smokeless tobacco: Never Used    Alcohol use 1.2 oz/week     1 Standard drinks or equivalent, 1 Glasses of wine per week    Drug use: No    Sexual activity: Yes     Partners: Male     Other Topics Concern    Not on file     Social History Narrative    No narrative on file       Family History   Problem Relation Age of Onset    Cancer Father         KIDNEY    Heart Disease Father         chf, defibrillator    Other Father         macular degeneration    High Blood Pressure Father     Cancer Paternal Grandmother         type unknown    Osteoporosis Mother     High Blood Pressure Mother        Current Outpatient Prescriptions on File Prior to Visit   Medication Sig Dispense Refill    zolpidem (AMBIEN) 10 MG tablet TAKE 1 TABLET BY MOUTH EVERY NIGHT AT BEDTIME AS NEEDED. 30 tablet 2    busPIRone (BUSPAR) 15 MG tablet Take 15 mg by mouth 3 times daily 270 tablet 1    betamethasone dipropionate (DIPROLENE) 0.05 % cream Daily prn 45 g 0    dexamethasone (DECADRON) 4 MG/ML injection To be used with physical therapy 30 mL 0    naproxen (NAPROSYN) 500 MG tablet Take 500 mg by mouth      latanoprost (XALATAN) 0.005 % ophthalmic solution Place  into both eyes daily.  nystatin-triamcinolone (MYCOLOG II) 922586-9.1 UNIT/GM-% cream Apply  topically. 4    Vitamin A 02944 UNITS TABS Take 15,000 Units by mouth.  Omega-3 Fatty Acids (FISH OIL) 1000 MG CAPS Take 1,200 mg by mouth daily        No current facility-administered medications on file prior to visit. Pertinent items are noted in HPI  Review of systems reviewed from Patient History Form dated on 1/9/2018 and available in the patient's chart under the Media tab. No change noted. PHYSICAL EXAMINATION:  Ms. Annika Sears is a very pleasant 59 y.o.  female who presents today in no acute distress, awake, alert, and oriented. She is well dressed, nourished and  groomed. Patient with normal affect. Height is  5' 6\" (1.676 m), weight is 123 lb (55.8 kg), Body mass index is 19.85 kg/m². Resting respiratory rate is 16. Examination of the gait, showed that the patient walks heel-toe with a non-antalgic gait and no limp.  Examination of both ankles showing a good range of motion.  She has dorsiflexion to about 5 degrees bilaterally, which increased with knee flexion.  She has intact sensation and good pedal pulses.  She has good strength in all

## 2018-08-27 NOTE — TELEPHONE ENCOUNTER
Pt called said her prolia is at pharmacy and she normally gets it thru us she would like to know what is going on with it

## 2018-08-30 ENCOUNTER — OFFICE VISIT (OUTPATIENT)
Dept: ENDOCRINOLOGY | Age: 58
End: 2018-08-30

## 2018-08-30 VITALS
WEIGHT: 122.4 LBS | BODY MASS INDEX: 19.67 KG/M2 | DIASTOLIC BLOOD PRESSURE: 80 MMHG | SYSTOLIC BLOOD PRESSURE: 126 MMHG | HEIGHT: 66 IN | OXYGEN SATURATION: 99 % | RESPIRATION RATE: 16 BRPM | HEART RATE: 80 BPM

## 2018-08-30 DIAGNOSIS — E55.9 VITAMIN D DEFICIENCY: ICD-10-CM

## 2018-08-30 DIAGNOSIS — M81.0 POSTMENOPAUSAL OSTEOPOROSIS: Primary | ICD-10-CM

## 2018-08-30 PROCEDURE — 1036F TOBACCO NON-USER: CPT | Performed by: INTERNAL MEDICINE

## 2018-08-30 PROCEDURE — 96372 THER/PROPH/DIAG INJ SC/IM: CPT | Performed by: INTERNAL MEDICINE

## 2018-08-30 PROCEDURE — G8427 DOCREV CUR MEDS BY ELIG CLIN: HCPCS | Performed by: INTERNAL MEDICINE

## 2018-08-30 PROCEDURE — 99214 OFFICE O/P EST MOD 30 MIN: CPT | Performed by: INTERNAL MEDICINE

## 2018-08-30 PROCEDURE — 3017F COLORECTAL CA SCREEN DOC REV: CPT | Performed by: INTERNAL MEDICINE

## 2018-08-30 PROCEDURE — G8420 CALC BMI NORM PARAMETERS: HCPCS | Performed by: INTERNAL MEDICINE

## 2018-08-30 NOTE — PROGRESS NOTES
change, speech change, focal weakness, seizures, loss of consciousness and headaches. Endo/Heme/Allergies: Negative for environmental allergies and polydipsia. Does not bruise/bleed easily. Psychiatric/Behavioral: Negative for depression, suicidal ideas, hallucinations, memory loss and substance abuse. The patient is not nervous/anxious and does not have insomnia. Physical Exam   Constitutional: She is oriented to person, place, and time. She appears well-developed. No distress. HENT:   Mouth/Throat: Oropharynx is clear and moist.   Eyes: EOM are normal.   Neck: No thyromegaly present. Cardiovascular: Normal rate and normal heart sounds. Pulmonary/Chest: Effort normal. No respiratory distress. She has no wheezes. Abdominal: Soft. Bowel sounds are normal. There is no tenderness. Musculoskeletal: She exhibits no edema. Neurological: She is alert and oriented to person, place, and time. Skin: Skin is warm and dry. She is not diaphoretic. Psychiatric: Her behavior is normal. Thought content normal.        INDICATION:Post-menopausal status      PERTINENT CLINICAL INFORMATION PROVIDED BY THE PATIENT: History of ankle fractures in the past,   history of maternal hip fracture, history of steroid use in the past      EQUIPMENT: [employer] QDR bone densitometer system      POSITIONING: Good REGIONS OF INTEREST: Correct ARTIFACTS: None STUDY VALID? Yes      LIMITATIONS:None      REPORT: There is no previous scan available on this machine for direct comparison. The skeletal sites assessed include lumbar spine and hips. The bone mineral density at lumbar spine from L1-L4 is 0.779 gram/cm2 corresponding to a    T-score of -2.4 and Z-score of -1.3. The bone mineral density at right femur neck is 0.560 gram/cm2 corresponding to a T-score of    -2.6 and Z-score of -1.5.    The bone mineral density at right total hip is 0.723 gram/cm2 corresponding to a T-score of    -1.8 and Z-score of -1.0.      The bone mineral density at left femur neck is 0.539 gram/cm2 corresponding to a T-score of    -2.8 and Z-score of -1.7. The bone mineral density at left total hip is 0.703 gram/cm2 corresponding to a T-score of -2.0   and Z-score of -1.2. Exam: DXA BONE DENSITY AXIAL SKELETON dated 5/24/2017 1:48 PM EDT       CLINICAL HISTORY: Age-related osteoporosis without current pathological fracture;        TECHNICAL: Bone mineral density analysis was performed on a [employer] scanner. The least    significant change based on the precision of this scanner is 4.5%   ARTIFACTS: None       COMPARISON: June 16, 2015           FINDINGS:   Spine (L1-L4):   BMD:  0.820 g/cm2, previously 0.779g/cm2, 5.2% increase. T-score: -2.1 SD    Z-score: -0.8 SD        Left femoral neck:    BMD: 0.564 g/cm2, previously 0.539g/cm2, 4.6% increase. T-score: -2.6  SD   Z-score: -1.4 SD       Left total hip:    BMD: 0.772 g/cm2 , previously 0.703g/cm2, 9.9% increase. T-score: -1.4 SD    Z-score: -0.5 SD        The following scores are defined as follows:    (1) The T-score is a comparison using standard deviation of the patient versus normal healthy    controls. (2) The Z-score is a comparison using standard deviation of the patient versus normal controls    matched for sex, race and age.       Age: 60 years   Gender: Female           IMPRESSION: Osteoporosis. Since DEXA scan June 16, 2015 increased bone mineral density of the    lumbar spine, left femoral neck, and left total hip.           Please note that the fracture risk depends on variables in addition to BMD.  The WHO (World    Health Organization) tool for fracture risk calculation for your patient over the next 10 years    may be found at: [employer].au. [employer]/tool.jsp.           Recommendations for followup testing: Followup BMD testing should be done when the expected change in BMD equals or exceeds the least    significant change ([address]).  To be considered statistically significant (p<0.05), a change in    bone mineral density must be greater than the least significant change based on the precision    of the scanner (see TECHNICAL above).     Intervals between BMD testing should be determined according to the clinical status of each    patient; typically one year after initiation or change of therapy is appropriate, with longer    intervals once therapeutic effect is established.       In conditions associated with rapid bone loss, such as glucocorticoid therapy, testing more    frequently is appropriate.       Reference: International Society for Clinical Densitometry       Report Verified by: Celine Alvarez MD at 5/24/2017 2:20 PM EDT                 Assessment/Plan      1. Post menopausal osteoporosis . This 59 yrs old female was found to have osteoporosis on DEXA scan done in 06/15. Her risk factors for osteoporosis include strong FH of osteoporosis, early menopause, steroid exposure. Work-up for secondary causes of osteoporosis showed normal CBC, renal and liver function. Normal TSH  Normal Vitamin D levels, PTH and Phos. 24 hr urine for calcium high normal.     Continue 1200 mg of calcium/day ( diet + supplement)      On prolia 60 mg SQ every 6 months since 01/16     DEXA scan in 05/17 showed T score of -2.1 ( 5.5 % increase), Left femoral neck -2.6 ( 4.4 % increase ) and Left Hip -1.4 ( 9.9 % increase)    Will continue Prolia. She was given a shot today in the office. 2. Vitamin D def . She stopped taking her vitamin D  Will check her levels today.        3. Depression managed by PCP

## 2018-08-31 LAB — VITAMIN D 25-HYDROXY: 42.5 NG/ML

## 2019-02-25 ENCOUNTER — TELEPHONE (OUTPATIENT)
Dept: ENDOCRINOLOGY | Age: 59
End: 2019-02-25

## 2019-02-28 ENCOUNTER — OFFICE VISIT (OUTPATIENT)
Dept: INTERNAL MEDICINE CLINIC | Age: 59
End: 2019-02-28
Payer: COMMERCIAL

## 2019-02-28 VITALS
SYSTOLIC BLOOD PRESSURE: 122 MMHG | BODY MASS INDEX: 20.25 KG/M2 | WEIGHT: 126 LBS | HEIGHT: 66 IN | HEART RATE: 75 BPM | OXYGEN SATURATION: 97 % | DIASTOLIC BLOOD PRESSURE: 80 MMHG

## 2019-02-28 DIAGNOSIS — F41.9 ANXIETY: ICD-10-CM

## 2019-02-28 DIAGNOSIS — F51.02 ADJUSTMENT INSOMNIA: Primary | ICD-10-CM

## 2019-02-28 DIAGNOSIS — F33.41 RECURRENT MAJOR DEPRESSIVE DISORDER, IN PARTIAL REMISSION (HCC): Chronic | ICD-10-CM

## 2019-02-28 DIAGNOSIS — Z12.11 SCREENING FOR COLON CANCER: ICD-10-CM

## 2019-02-28 DIAGNOSIS — H35.52 RETINITIS PIGMENTOSA: ICD-10-CM

## 2019-02-28 PROCEDURE — 3017F COLORECTAL CA SCREEN DOC REV: CPT | Performed by: INTERNAL MEDICINE

## 2019-02-28 PROCEDURE — 1036F TOBACCO NON-USER: CPT | Performed by: INTERNAL MEDICINE

## 2019-02-28 PROCEDURE — 99214 OFFICE O/P EST MOD 30 MIN: CPT | Performed by: INTERNAL MEDICINE

## 2019-02-28 PROCEDURE — G8427 DOCREV CUR MEDS BY ELIG CLIN: HCPCS | Performed by: INTERNAL MEDICINE

## 2019-02-28 PROCEDURE — G8420 CALC BMI NORM PARAMETERS: HCPCS | Performed by: INTERNAL MEDICINE

## 2019-02-28 PROCEDURE — G8484 FLU IMMUNIZE NO ADMIN: HCPCS | Performed by: INTERNAL MEDICINE

## 2019-02-28 RX ORDER — BUSPIRONE HYDROCHLORIDE 15 MG/1
15 TABLET ORAL 3 TIMES DAILY
Qty: 270 TABLET | Refills: 3 | Status: SHIPPED | OUTPATIENT
Start: 2019-02-28

## 2019-02-28 RX ORDER — BETAMETHASONE DIPROPIONATE 0.5 MG/G
CREAM TOPICAL
Qty: 45 G | Refills: 5 | Status: SHIPPED | OUTPATIENT
Start: 2019-02-28 | End: 2019-12-27

## 2019-02-28 RX ORDER — ZOLPIDEM TARTRATE 10 MG/1
TABLET ORAL
Qty: 30 TABLET | Refills: 5 | Status: SHIPPED | OUTPATIENT
Start: 2019-02-28 | End: 2019-10-24 | Stop reason: SDUPTHER

## 2019-03-04 ENCOUNTER — NURSE ONLY (OUTPATIENT)
Dept: ENDOCRINOLOGY | Age: 59
End: 2019-03-04
Payer: COMMERCIAL

## 2019-03-04 PROCEDURE — 96372 THER/PROPH/DIAG INJ SC/IM: CPT | Performed by: INTERNAL MEDICINE

## 2019-05-13 ENCOUNTER — TELEPHONE (OUTPATIENT)
Dept: INTERNAL MEDICINE CLINIC | Age: 59
End: 2019-05-13

## 2019-05-13 DIAGNOSIS — Z12.11 SCREENING FOR COLON CANCER: Primary | ICD-10-CM

## 2019-05-13 NOTE — TELEPHONE ENCOUNTER
Left message on pt voicemail informing her that order for cologuard has been placed and faxed to [facility] and that they will be in contact with her with the following steps

## 2019-06-17 DIAGNOSIS — Z12.11 SCREENING FOR COLON CANCER: ICD-10-CM

## 2019-07-16 ENCOUNTER — OFFICE VISIT (OUTPATIENT)
Dept: ORTHOPEDIC SURGERY | Age: 59
End: 2019-07-16
Payer: COMMERCIAL

## 2019-07-16 VITALS
HEART RATE: 64 BPM | SYSTOLIC BLOOD PRESSURE: 113 MMHG | BODY MASS INDEX: 20.25 KG/M2 | RESPIRATION RATE: 16 BRPM | HEIGHT: 66 IN | WEIGHT: 126 LBS | DIASTOLIC BLOOD PRESSURE: 75 MMHG

## 2019-07-16 DIAGNOSIS — G57.62 MORTON'S NEUROMA OF SECOND INTERSPACE OF LEFT FOOT: Primary | ICD-10-CM

## 2019-07-16 PROCEDURE — 1036F TOBACCO NON-USER: CPT | Performed by: ORTHOPAEDIC SURGERY

## 2019-07-16 PROCEDURE — 99214 OFFICE O/P EST MOD 30 MIN: CPT | Performed by: ORTHOPAEDIC SURGERY

## 2019-07-16 PROCEDURE — G8427 DOCREV CUR MEDS BY ELIG CLIN: HCPCS | Performed by: ORTHOPAEDIC SURGERY

## 2019-07-16 PROCEDURE — 3017F COLORECTAL CA SCREEN DOC REV: CPT | Performed by: ORTHOPAEDIC SURGERY

## 2019-07-16 PROCEDURE — G8420 CALC BMI NORM PARAMETERS: HCPCS | Performed by: ORTHOPAEDIC SURGERY

## 2019-07-16 PROCEDURE — 64450 NJX AA&/STRD OTHER PN/BRANCH: CPT | Performed by: ORTHOPAEDIC SURGERY

## 2019-08-29 ENCOUNTER — OFFICE VISIT (OUTPATIENT)
Dept: PRIMARY CARE CLINIC | Age: 59
End: 2019-08-29
Payer: COMMERCIAL

## 2019-08-29 VITALS
SYSTOLIC BLOOD PRESSURE: 104 MMHG | WEIGHT: 125 LBS | DIASTOLIC BLOOD PRESSURE: 62 MMHG | HEART RATE: 63 BPM | HEIGHT: 66 IN | BODY MASS INDEX: 20.09 KG/M2 | OXYGEN SATURATION: 98 %

## 2019-08-29 DIAGNOSIS — Z00.00 ANNUAL PHYSICAL EXAM: Primary | ICD-10-CM

## 2019-08-29 DIAGNOSIS — F41.9 ANXIETY: ICD-10-CM

## 2019-08-29 DIAGNOSIS — F33.41 RECURRENT MAJOR DEPRESSIVE DISORDER, IN PARTIAL REMISSION (HCC): Chronic | ICD-10-CM

## 2019-08-29 DIAGNOSIS — Z00.00 ANNUAL PHYSICAL EXAM: ICD-10-CM

## 2019-08-29 LAB
A/G RATIO: 1.6 (ref 1.1–2.2)
ALBUMIN SERPL-MCNC: 4.4 G/DL (ref 3.4–5)
ALP BLD-CCNC: 38 U/L (ref 40–129)
ALT SERPL-CCNC: 10 U/L (ref 10–40)
ANION GAP SERPL CALCULATED.3IONS-SCNC: 15 MMOL/L (ref 3–16)
AST SERPL-CCNC: 19 U/L (ref 15–37)
BASOPHILS ABSOLUTE: 0.1 K/UL (ref 0–0.2)
BASOPHILS RELATIVE PERCENT: 1.4 %
BILIRUB SERPL-MCNC: 0.4 MG/DL (ref 0–1)
BUN BLDV-MCNC: 9 MG/DL (ref 7–20)
CALCIUM SERPL-MCNC: 9.1 MG/DL (ref 8.3–10.6)
CHLORIDE BLD-SCNC: 104 MMOL/L (ref 99–110)
CHOLESTEROL, TOTAL: 253 MG/DL (ref 0–199)
CO2: 24 MMOL/L (ref 21–32)
CREAT SERPL-MCNC: 0.6 MG/DL (ref 0.6–1.2)
EOSINOPHILS ABSOLUTE: 0.3 K/UL (ref 0–0.6)
EOSINOPHILS RELATIVE PERCENT: 7 %
GFR AFRICAN AMERICAN: >60
GFR NON-AFRICAN AMERICAN: >60
GLOBULIN: 2.7 G/DL
GLUCOSE BLD-MCNC: 88 MG/DL (ref 70–99)
HCT VFR BLD CALC: 38.6 % (ref 36–48)
HDLC SERPL-MCNC: 80 MG/DL (ref 40–60)
HEMOGLOBIN: 13.3 G/DL (ref 12–16)
LDL CHOLESTEROL CALCULATED: 161 MG/DL
LYMPHOCYTES ABSOLUTE: 1.4 K/UL (ref 1–5.1)
LYMPHOCYTES RELATIVE PERCENT: 31.4 %
MCH RBC QN AUTO: 31.3 PG (ref 26–34)
MCHC RBC AUTO-ENTMCNC: 34.4 G/DL (ref 31–36)
MCV RBC AUTO: 90.9 FL (ref 80–100)
MONOCYTES ABSOLUTE: 0.4 K/UL (ref 0–1.3)
MONOCYTES RELATIVE PERCENT: 8.9 %
NEUTROPHILS ABSOLUTE: 2.3 K/UL (ref 1.7–7.7)
NEUTROPHILS RELATIVE PERCENT: 51.3 %
PDW BLD-RTO: 13.4 % (ref 12.4–15.4)
PLATELET # BLD: 203 K/UL (ref 135–450)
PMV BLD AUTO: 7.2 FL (ref 5–10.5)
POTASSIUM SERPL-SCNC: 4.3 MMOL/L (ref 3.5–5.1)
RBC # BLD: 4.25 M/UL (ref 4–5.2)
SODIUM BLD-SCNC: 143 MMOL/L (ref 136–145)
TOTAL PROTEIN: 7.1 G/DL (ref 6.4–8.2)
TRIGL SERPL-MCNC: 62 MG/DL (ref 0–150)
TSH SERPL DL<=0.05 MIU/L-ACNC: 3.43 UIU/ML (ref 0.27–4.2)
VLDLC SERPL CALC-MCNC: 12 MG/DL
WBC # BLD: 4.6 K/UL (ref 4–11)

## 2019-08-29 PROCEDURE — 99396 PREV VISIT EST AGE 40-64: CPT | Performed by: INTERNAL MEDICINE

## 2019-08-29 NOTE — PROGRESS NOTES
normal. No drainage or sinus tenderness. Oropharynx - Lips, mucosa, and tongue normal. Teeth and gums normal. Oropharynx pink and patent  Neck - Neck supple. No adenopathy. Thyroid symmetric, normal size,  Back - Back symmetric, no curvature. ROM normal. No CVA tenderness. Lungs - Percussion normal. Good diaphragmatic excursion. Lungs clear  Heart - Regular rate and rhythm, with no rub, murmur or gallop noted. Abdomen - Abdomen soft, non-tender. BS normal. No masses, organomegaly  Extremities - Extremities normal. No deformities, edema, or skin discolora  Musculoskeletal - Spine ROM normal. Muscular strength intact. Peripheral pulses - radial=2+,, femoral=2+, popliteal=2+, dorsalis pedis=2+,  Neuro - Gait normal. Reflexes normal and symmetric. Sensation grossly normal.  No focal weakness       Assessment :     Annual physical     Plan :      Will get blood work,  Refill meds,  F/u in 1 year,    Mitul Yepez MD  8/29/2019 9:21 AM

## 2019-08-30 LAB
ESTIMATED AVERAGE GLUCOSE: 108.3 MG/DL
HBA1C MFR BLD: 5.4 %

## 2019-09-19 ENCOUNTER — NURSE ONLY (OUTPATIENT)
Dept: ENDOCRINOLOGY | Age: 59
End: 2019-09-19
Payer: COMMERCIAL

## 2019-09-19 DIAGNOSIS — M81.0 POSTMENOPAUSAL OSTEOPOROSIS: ICD-10-CM

## 2019-09-19 PROCEDURE — 96372 THER/PROPH/DIAG INJ SC/IM: CPT | Performed by: INTERNAL MEDICINE

## 2019-09-30 ENCOUNTER — TELEPHONE (OUTPATIENT)
Dept: PRIMARY CARE CLINIC | Age: 59
End: 2019-09-30

## 2019-10-24 DIAGNOSIS — F51.02 ADJUSTMENT INSOMNIA: ICD-10-CM

## 2019-10-24 RX ORDER — ZOLPIDEM TARTRATE 10 MG/1
TABLET ORAL
Qty: 30 TABLET | Refills: 3 | Status: SHIPPED | OUTPATIENT
Start: 2019-10-24 | End: 2019-12-24

## 2019-12-27 RX ORDER — BETAMETHASONE DIPROPIONATE 0.5 MG/G
CREAM TOPICAL
Qty: 45 G | Refills: 0 | Status: SHIPPED | OUTPATIENT
Start: 2019-12-27

## 2020-03-16 ENCOUNTER — TELEPHONE (OUTPATIENT)
Dept: ENDOCRINOLOGY | Age: 60
End: 2020-03-16

## 2020-03-18 ENCOUNTER — TELEPHONE (OUTPATIENT)
Dept: ENDOCRINOLOGY | Age: 60
End: 2020-03-18

## 2020-03-18 NOTE — TELEPHONE ENCOUNTER
Patient cancelled Prolia injection due to the following reason: Covid-19     Prolia appointment date: 3/25/20    Did patient reschedule?: no    Call patient to confirm Prolia has been rescheduled. The ideal schedule for Prolia is every 6 months. In clinical trials, up to 30 days late seemed to be OK. This letter is a reminder of the need to stay on schedule for Prolia. If Prolia is stopped or significantly delayed, gains in bone density will be lost and there is a small chance of multiple vertebral fractures. If you canceled Prolia because you were ill, please reschedule as soon as you have recovered. If you are well but canceled Prolia to avoid contact with people, we recommend that you reschedule as soon as you can. Our office is following standard precautions so your chance of exposure to Covid-19 is minimal.  As more cases occur in our community, chances of exposure will increase, so it may be safer for you to come sooner, not later.

## 2020-03-19 NOTE — TELEPHONE ENCOUNTER
Spoke with pt and advised pt to r/s asap and to not go over a month for getting the prolia injection. Will send letter to pt. Pt stated understanding.

## 2020-04-15 NOTE — TELEPHONE ENCOUNTER
Pt called to schedule her prolia injection. I advised pt she was due in March and it should be scheduled as soon as possible. Pt did not want to schedule until 5/4/2020. I informed pt that would make her injection over a month over due and pt stated she understood.

## 2020-04-23 ENCOUNTER — APPOINTMENT (RX ONLY)
Dept: URBAN - METROPOLITAN AREA CLINIC 126 | Facility: CLINIC | Age: 60
Setting detail: DERMATOLOGY
End: 2020-04-23

## 2020-04-23 DIAGNOSIS — L30.1 DYSHIDROSIS [POMPHOLYX]: ICD-10-CM

## 2020-04-23 PROBLEM — Z92.89 PERSONAL HISTORY OF OTHER MEDICAL TREATMENT: Status: ACTIVE | Noted: 2020-04-23

## 2020-04-23 PROCEDURE — ? PRESCRIPTION

## 2020-04-23 PROCEDURE — ? COUNSELING

## 2020-04-23 PROCEDURE — ? TELEHEALTH ASSESSMENT

## 2020-04-23 PROCEDURE — 99202 OFFICE O/P NEW SF 15 MIN: CPT | Mod: GT

## 2020-04-23 PROCEDURE — ? TREATMENT REGIMEN

## 2020-04-23 RX ORDER — BETAMETHASONE DIPROPIONATE 0.5 MG/G
CREAM TOPICAL
Qty: 1 | Refills: 1 | Status: ERX | COMMUNITY
Start: 2020-04-23

## 2020-04-23 RX ADMIN — BETAMETHASONE DIPROPIONATE: 0.5 CREAM TOPICAL at 00:00

## 2020-04-23 ASSESSMENT — LOCATION SIMPLE DESCRIPTION DERM
LOCATION SIMPLE: LEFT PLANTAR SURFACE
LOCATION SIMPLE: LEFT HAND
LOCATION SIMPLE: RIGHT HAND
LOCATION SIMPLE: RIGHT PLANTAR SURFACE

## 2020-04-23 ASSESSMENT — LOCATION DETAILED DESCRIPTION DERM
LOCATION DETAILED: LEFT MEDIAL PLANTAR MIDFOOT
LOCATION DETAILED: RIGHT ULNAR PALM
LOCATION DETAILED: LEFT ULNAR PALM
LOCATION DETAILED: RIGHT PLANTAR FOREFOOT OVERLYING 1ST METATARSAL

## 2020-04-23 ASSESSMENT — LOCATION ZONE DERM
LOCATION ZONE: HAND
LOCATION ZONE: FEET

## 2020-04-23 NOTE — PROCEDURE: MIPS QUALITY
Additional Notes: patient unable to provide complete medication list
Detail Level: Detailed
Quality 130: Documentation Of Current Medications In The Medical Record: Eligible clinician attests to documenting in the medical record the patient is not eligible for a current list of medications being obtained, updated, or reviewed by the eligible clinician

## 2020-04-23 NOTE — PROCEDURE: TELEHEALTH ASSESSMENT
Recommendation Preamble: Assessment:
Detail Level: Simple
Assessment (Free Text): The patient verified their identity with their photo ID and consented to evaluation and management of their medical condition through telehealth. This visit was conducted in real-time with an audio and video platform, Doxy. med during the [address] during a state of national emergency. This telehealth visit was medically necessary to prevent the community spread of COVID-19. \\n\\nThe patient is aware that we will bill their insurance for this telehealth visit following insurance guidelines. \\n\\nFace to face time with the patient was 15 minutes. \\n\\nConsent:\\nPatient consented to the following prior to the visit: \"I consent and understand that I am initiating a synchronous video health session with my healthcare provider and will be asked to confirm my identity with photo identification. I understand that there are potential risks to this technology, including interruptions, potential data breaches, and technical difficulties. Poor video quality may interfere with my healthcare providerâs ability to accurately diagnose my condition. I understand that my health care provider or I can discontinue the telemedicine consult/visit if it is felt that the videoconferencing connections are not adequate for the situation. I also understand that my insurance carrier will be billed for healthcare services rendered. \"

## 2020-04-23 NOTE — PROCEDURE: TREATMENT REGIMEN
Detail Level: Simple
Plan: Discussed   Cleaning area with soap and water and pat dry but keep site damp, then Apply medicine then aquaphor or Vaseline and a wear a sock/gloves.

## 2020-04-23 NOTE — HPI: RASH
How Severe Is Your Rash?: mild
Is This A New Presentation, Or A Follow-Up?: Rash
Additional History: Pt states she saw two other providers that informed her that it could be athletes foot or ringworm and recommended vinegar soaks or hydrogen peroxide as well as applying lotrimin and has had no improvement.

## 2020-05-05 ENCOUNTER — NURSE ONLY (OUTPATIENT)
Dept: ENDOCRINOLOGY | Age: 60
End: 2020-05-05
Payer: COMMERCIAL

## 2020-05-05 PROCEDURE — 96372 THER/PROPH/DIAG INJ SC/IM: CPT | Performed by: INTERNAL MEDICINE

## 2020-10-29 ENCOUNTER — TELEPHONE (OUTPATIENT)
Dept: ENDOCRINOLOGY | Age: 60
End: 2020-10-29

## 2020-10-29 NOTE — TELEPHONE ENCOUNTER
The last amgen SOB I see is from 3/2020 which stated she did need a PA but when I called, insurance stated no PA was needed. There is a telephone encounter from 3/16/2020. I am more than happy to call again to double check. Is the pt going to keep the 11/16 appt with Dr. Gomez Shepherd? And then switch to Dr. Taye Simpson after?

## 2020-10-29 NOTE — TELEPHONE ENCOUNTER
Spoke with pt and advised per Dr. Sandip Orellana: Will recommend she sees Dr. Eduard Sun in [place] office for further management. Pt stated understanding. Kasey- does pt need a PA for her Prolia or is this still good? Insurance has not changed per pt.

## 2020-10-29 NOTE — TELEPHONE ENCOUNTER
Pt called to see if there is a way she can switch to someone in [place] due to it being a lot closer for her as well as Dr. Aranda Neighbours leaving . If so she would like to know who he recommends.

## 2020-11-16 ENCOUNTER — OFFICE VISIT (OUTPATIENT)
Dept: ENDOCRINOLOGY | Age: 60
End: 2020-11-16
Payer: COMMERCIAL

## 2020-11-16 VITALS
OXYGEN SATURATION: 98 % | WEIGHT: 122 LBS | HEIGHT: 66 IN | HEART RATE: 67 BPM | SYSTOLIC BLOOD PRESSURE: 119 MMHG | BODY MASS INDEX: 19.61 KG/M2 | DIASTOLIC BLOOD PRESSURE: 75 MMHG

## 2020-11-16 PROCEDURE — 99214 OFFICE O/P EST MOD 30 MIN: CPT | Performed by: INTERNAL MEDICINE

## 2020-11-16 PROCEDURE — G8484 FLU IMMUNIZE NO ADMIN: HCPCS | Performed by: INTERNAL MEDICINE

## 2020-11-16 PROCEDURE — 3017F COLORECTAL CA SCREEN DOC REV: CPT | Performed by: INTERNAL MEDICINE

## 2020-11-16 PROCEDURE — 96372 THER/PROPH/DIAG INJ SC/IM: CPT | Performed by: INTERNAL MEDICINE

## 2020-11-16 PROCEDURE — 1036F TOBACCO NON-USER: CPT | Performed by: INTERNAL MEDICINE

## 2020-11-16 PROCEDURE — G8427 DOCREV CUR MEDS BY ELIG CLIN: HCPCS | Performed by: INTERNAL MEDICINE

## 2020-11-16 PROCEDURE — G8420 CALC BMI NORM PARAMETERS: HCPCS | Performed by: INTERNAL MEDICINE

## 2020-11-16 RX ORDER — ESCITALOPRAM OXALATE 10 MG/1
TABLET ORAL
COMMUNITY
Start: 2020-10-03

## 2020-11-16 NOTE — PROGRESS NOTES
SPACE MORTONS NEUROMA EXCISION    HYSTERECTOMY      WITHOUT OOPHERECTOMY     Family History   Problem Relation Age of Onset    Cancer Father         KIDNEY    Heart Disease Father         chf, defibrillator    Other Father         macular degeneration    High Blood Pressure Father     Cancer Paternal Grandmother         type unknown    Osteoporosis Mother     High Blood Pressure Mother      Social History     Tobacco Use   Smoking Status Never Smoker   Smokeless Tobacco Never Used      Social History     Substance and Sexual Activity   Alcohol Use Yes    Alcohol/week: 2.0 standard drinks    Types: 1 Glasses of wine, 1 Standard drinks or equivalent per week       UCHE Isaac is a 62 y.o. female who is here for a follow-up visit for management of osteoporosis   PCP Gabriel Regalado MD       She has a PMH of depression and retinitis pigmentosa     DEXA scan in 06/15 showed T score at Lumbar spine -2.4 and Left Femoral neck -2.8     History of fractures : Ankle fracture ( 2010)    Pharmacological therapy for Osteoprosis: Prolia since 01/16      FH of Osteoporosis: mother has osteoporosis  Mother had shoulder fracture, hip fracture. Secondary causes of osteoprorsis:   Had  Menopause at 41 yrs. No smoking,   Had steroid injections for 2 yrs for mold exposure    Calcium: 300mg/d diet + 500 mg/d supplement. Vitamin D 3 : Not taking      Exercise: walking walking 60 minutes/day- 7 days/week. Weight training     Follows up regularly with a dentist. No major dental procedures planned. Review of Systems   Constitutional: Positive for malaise/fatigue. Negative for fever, chills, weight loss and diaphoresis. Eyes: Negative for blurred vision, double vision and photophobia. Respiratory: Negative for cough and hemoptysis. Cardiovascular: Negative for chest pain, palpitations and orthopnea. Genitourinary: Negative for dysuria, urgency, frequency, hematuria and flank pain. 0.560 gram/cm2 corresponding to a T-score of    -2.6 and Z-score of -1.5. The bone mineral density at right total hip is 0.723 gram/cm2 corresponding to a T-score of    -1.8 and Z-score of -1.0. The bone mineral density at left femur neck is 0.539 gram/cm2 corresponding to a T-score of    -2.8 and Z-score of -1.7. The bone mineral density at left total hip is 0.703 gram/cm2 corresponding to a T-score of -2.0   and Z-score of -1.2. Exam: DXA BONE DENSITY AXIAL SKELETON dated 5/24/2017 1:48 PM EDT       CLINICAL HISTORY: Age-related osteoporosis without current pathological fracture;        TECHNICAL: Bone mineral density analysis was performed on a [employer] scanner. The least    significant change based on the precision of this scanner is 4.5%   ARTIFACTS: None       COMPARISON: June 16, 2015           FINDINGS:   Spine (L1-L4):   BMD:  0.820 g/cm2, previously 0.779g/cm2, 5.2% increase. T-score: -2.1 SD    Z-score: -0.8 SD        Left femoral neck:    BMD: 0.564 g/cm2, previously 0.539g/cm2, 4.6% increase. T-score: -2.6  SD   Z-score: -1.4 SD       Left total hip:    BMD: 0.772 g/cm2 , previously 0.703g/cm2, 9.9% increase. T-score: -1.4 SD    Z-score: -0.5 SD        The following scores are defined as follows:    (1) The T-score is a comparison using standard deviation of the patient versus normal healthy    controls. (2) The Z-score is a comparison using standard deviation of the patient versus normal controls    matched for sex, race and age.       Age: 60 years   Gender: Female           IMPRESSION: Osteoporosis. Since DEXA scan June 16, 2015 increased bone mineral density of the    lumbar spine, left femoral neck, and left total hip.           Please note that the fracture risk depends on variables in addition to BMD.  The WHO (World    Health Organization) tool for fracture risk calculation for your patient over the next 10 years    may be found at: Wintegra.Ziipa.au. FRAX/tool.jsp.         Recommendations for followup testing: Followup BMD testing should be done when the expected change in BMD equals or exceeds the least    significant change ([address]). To be considered statistically significant (p<0.05), a change in    bone mineral density must be greater than the least significant change based on the precision    of the scanner (see TECHNICAL above).     Intervals between BMD testing should be determined according to the clinical status of each    patient; typically one year after initiation or change of therapy is appropriate, with longer    intervals once therapeutic effect is established.       In conditions associated with rapid bone loss, such as glucocorticoid therapy, testing more    frequently is appropriate.       Reference: International Society for Clinical Densitometry       Report Verified by: Sukhi Barreto MD at 5/24/2017 2:20 PM EDT                 Assessment/Plan      1. Post menopausal osteoporosis . This 62 yrs old female was found to have osteoporosis on DEXA scan done in 06/15. Her risk factors for osteoporosis include strong FH of osteoporosis, early menopause, steroid exposure. Work-up for secondary causes of osteoporosis showed normal CBC, renal and liver function. Normal TSH  Normal Vitamin D levels, PTH and Phos. 24 hr urine for calcium high normal.     Continue 1200 mg of calcium/day ( diet + supplement)      On prolia 60 mg SQ every 6 months since 01/16     DEXA scan in 05/17 showed T score of -2.1 ( 5.5 % increase), Left femoral neck -2.6 ( 4.4 % increase ) and Left Hip -1.4 ( 9.9 % increase)    Will repeat DEXA scan at [facility] where she had her last DEXA scan    Will continue Prolia. She was given a shot today in the office. 2. Vitamin D def . Vit D 42.5 in 08/18        3.  Depression managed by PCP       Will see Dr. Varsha Torre in 6 months in [facility] for further care

## 2020-12-02 ENCOUNTER — TELEPHONE (OUTPATIENT)
Dept: ENDOCRINOLOGY | Age: 60
End: 2020-12-02

## 2020-12-02 NOTE — TELEPHONE ENCOUNTER
Patient called and said she got her dexa done at [facility] and would like to know the results

## 2020-12-02 NOTE — TELEPHONE ENCOUNTER
Please advise the patient that her bone density has improved from last time and is in the osteopenia range this time.    Continue Prolia

## 2020-12-02 NOTE — TELEPHONE ENCOUNTER
Called pt and l/m per HIPAA and per Dr. Yessenia Shelton: Please advise the patient that her bone density has improved from last time and is in the osteopenia range this time. Continue Prolia.   12/2/2020 @ 212pm.

## 2021-05-20 ENCOUNTER — OFFICE VISIT (OUTPATIENT)
Dept: ENDOCRINOLOGY | Age: 61
End: 2021-05-20
Payer: COMMERCIAL

## 2021-05-20 VITALS
DIASTOLIC BLOOD PRESSURE: 78 MMHG | SYSTOLIC BLOOD PRESSURE: 120 MMHG | OXYGEN SATURATION: 100 % | BODY MASS INDEX: 19.93 KG/M2 | HEIGHT: 66 IN | HEART RATE: 70 BPM | RESPIRATION RATE: 14 BRPM | WEIGHT: 124 LBS

## 2021-05-20 DIAGNOSIS — M81.0 AGE-RELATED OSTEOPOROSIS WITHOUT CURRENT PATHOLOGICAL FRACTURE: Primary | ICD-10-CM

## 2021-05-20 PROCEDURE — 99214 OFFICE O/P EST MOD 30 MIN: CPT | Performed by: INTERNAL MEDICINE

## 2021-05-20 PROCEDURE — G8427 DOCREV CUR MEDS BY ELIG CLIN: HCPCS | Performed by: INTERNAL MEDICINE

## 2021-05-20 PROCEDURE — G8420 CALC BMI NORM PARAMETERS: HCPCS | Performed by: INTERNAL MEDICINE

## 2021-05-20 PROCEDURE — 3017F COLORECTAL CA SCREEN DOC REV: CPT | Performed by: INTERNAL MEDICINE

## 2021-05-20 PROCEDURE — 1036F TOBACCO NON-USER: CPT | Performed by: INTERNAL MEDICINE

## 2021-05-20 PROCEDURE — 96372 THER/PROPH/DIAG INJ SC/IM: CPT | Performed by: INTERNAL MEDICINE

## 2021-05-20 NOTE — PROGRESS NOTES
SUBJECTIVE:  Casey Gee is a 56 y.o. female who is being evaluated for hypothyroidism. 1. Age-related osteoporosis without current pathological fracture    This started in 2015. Patient was diagnosed with osteoporosis. The problem has been unchanged. Patient started medication in 2016. Currently patient is on: Prolia. Misses 0 doses a month. Past medical history of depression and retinitis pigmentosa    DEXA scan 6/2015 showed T score of lumbar spine -2.4 and left femoral neck -2.8  History of fractures: Ankle fracture 2010  Pharmacological therapy for osteoporosis: Prolia since 1/2016  Mother had osteoporosis  Mother had shoulder fracture and hip fracture  Menopause at age 43  Steroid injections for 2 years for mold exposure  Not taking vitamin D  DEXA scan November 2020 showed the T score of lumbar spine -1.9, left femoral neck -2.4, improved  Current complaints: No fatigue       Impression    IMPRESSION:     Low bone density, not within the range of osteoporosis by [facility] guidelines although T score -2.4 is near the osteoporosis threshold of -2.5. Since the study dated 5/24/2017, bone mineral density has significantly increased in the right femoral neck. FRAX 10-year risk for major osteoporotic fracture is 29 % and for hip fracture is 3.5 % untreated. Please note that the fracture risk depends on variables in addition to BMD.  The WHO (World Health Organization) tool for fracture risk calculation for your patient over the next 10 years may be found at: Q2ebanking.au. FRAX/tool.jsp. Recommendations for followup testing   Intervals between BMD testing should be determined according to the clinical status of each patient; typically one year after initiation or change of therapy is appropriate, with longer intervals once therapeutic effect is established. In conditions associated with rapid bone loss, such as glucocorticoid therapy, testing more frequently is appropriate.        Report Verified by: Vickie Romero MD at 11/17/2020 1:33 PM EST  Narrative    EXAM: DXA BONE DENSITY AXIAL SKELETON     INDICATION: Osteoporosis, unspecified osteoporosis type, unspecified pathological fracture presence     TECHNICAL: Bone mineral density analysis was performed on a [employer] scanner. The least significant change based on the precision of this scanner is 0.022 g/cm2 for the spine, 0.023 g/cm2 for the distal forearm (1/3 radius), and 0.027 g/cm2 for the hip. ARTIFACTS: There is lumbar scoliosis and spondylosis which may spuriously elevate the measured bone mineral density. COMPARISON: 5/24/2017   FINDINGS:   Spine (L1-L4):   BMD:  0.837 g/cm2, previously    0.820 g/cm2,   +2.0 % change   T-score: -1.9 SD   Z-score: -0.4 SD     Left femoral neck:   BMD: 0.583 g/cm2, previously  0.564   g/cm2,  +3.4  % change   T-score: -2.4  SD   Z-score: -1.0 SD     Left total hip:   BMD: 0.770 g/cm2, previously  0.772   g/cm2,   -0.4 % change   T-score: -1.4 SD   Z-score: -0.4 SD     Right femoral neck:   BMD: 0.601 g/cm2, previously 0.569 g/cm2, +5.6 % change   T-score: -2.2 SD   Z-score: -0.9 SD     Right total hip:   BMD: 0.783 g/cm2, previously 0.761 g/cm2, +2.9 % change   T-score: -1.3 SD   Z-score: -0.3 SD         The following scores are defined as follows:   (1) The T-score is a comparison using standard deviation of the patient versus normal healthy controls. (2) The Z-score is a comparison using standard deviation of the patient versus normal controls matched for sex, race and age.      Age: 62 years   Gender: Female      Past Medical History:   Diagnosis Date    Depression     Recurrent major depressive disorder, in partial remission ([facility] [address] [facility] 9/14/2010    Retinopathy     WITH BLINDNESS PARTIAL, RETINITIS PIGMENTOSA    Siriasis      Patient Active Problem List    Diagnosis Date Noted    Mcgninis's neuroma of second interspace of right foot 05/17/2018    Mcginnis's neuroma of second interspace of left foot 2018    Bunion, right foot 2018    Mcginnis's neuroma of left foot 2018    Dupuytren's contracture of both hands 10/31/2017    Adjustment insomnia 2017    Anxiety 2016    Dry eyes 2015    Myalgia 2014    Recurrent cold sores 2012    Mass of leg 2012    Retinitis pigmentosa 2011    [facility]     Past Surgical History:   Procedure Laterality Date     SECTION      CYSTOSCOPY      FOR URETER STONE REMOVAL    FOOT NEUROMA SURGERY Left 2018    LEFT FOOT SECOND WEB SPACE MORTONS NEUROMA EXCISION    FOOT SURGERY Left 2018    LEFT FOOT SECOND WEB SPACE MORTONS NEUROMA EXCISION    HYSTERECTOMY      WITHOUT OOPHERECTOMY     Family History   Problem Relation Age of Onset    Cancer Father         KIDNEY    Heart Disease Father         chf, defibrillator    Other Father         macular degeneration    High Blood Pressure Father     Cancer Paternal Grandmother         type unknown    Osteoporosis Mother     High Blood Pressure Mother      Social History     Socioeconomic History    Marital status:      Spouse name: None    Number of children: None    Years of education: None    Highest education level: None   Occupational History    None   Tobacco Use    Smoking status: Never Smoker    Smokeless tobacco: Never Used   Vaping Use    Vaping Use: Never used   Substance and Sexual Activity    Alcohol use:  Yes     Alcohol/week: 2.0 standard drinks     Types: 1 Glasses of wine, 1 Standard drinks or equivalent per week    Drug use: No    Sexual activity: Yes     Partners: Male   Other Topics Concern    None   Social History Narrative    None     Social Determinants of Health     Financial Resource Strain:     Difficulty of Paying Living Expenses:    Food Insecurity:     Worried About Running Out of Food in the Last Year:     Isis of Food in the Last Year:    Transportation Needs:     Lack of Transportation (Medical):  Lack of Transportation (Non-Medical):    Physical Activity:     Days of Exercise per Week:     Minutes of Exercise per Session:    Stress:     Feeling of Stress :    Social Connections:     Frequency of Communication with Friends and Family:     Frequency of Social Gatherings with Friends and Family:     Attends Sabianist Services:     Active Member of Clubs or Organizations:     Attends Club or Organization Meetings:     Marital Status:    Intimate Partner Violence:     Fear of Current or Ex-Partner:     Emotionally Abused:     Physically Abused:     Sexually Abused:      Current Outpatient Medications   Medication Sig Dispense Refill    escitalopram (LEXAPRO) 10 MG tablet       betamethasone dipropionate (DIPROLENE) 0.05 % cream APPLY EXTERNALLY TO THE AFFECTED AREA DAILY AS NEEDED 45 g 0    busPIRone (BUSPAR) 15 MG tablet Take 15 mg by mouth 3 times daily 270 tablet 3    latanoprost (XALATAN) 0.005 % ophthalmic solution Place  into both eyes daily.  nystatin-triamcinolone (MYCOLOG II) 560808-0.1 UNIT/GM-% cream Apply  topically. 4    Vitamin A 77910 UNITS TABS Take 15,000 Units by mouth.  Omega-3 Fatty Acids (FISH OIL) 1000 MG CAPS Take 1,200 mg by mouth daily       Efinaconazole 10 % SOLN Apply 3 times daily, (Patient not taking: Reported on 2021) 1 Bottle 5     No current facility-administered medications for this visit.      Allergies   Allergen Reactions    Penicillins Nausea And Vomiting    Vicodin [Hydrocodone-Acetaminophen] Itching     Family Status   Relation Name Status    Father  Alive    [address]      Mother  Alive    Brother  Alive    MGM      MGF      PGF         Review of Systems:  Constitutional: no fatigue, no fever, no recent weight gain, no recent weight loss, no changes in appetite  Eyes: no eye pain, no change in vision, no eye redness, no eye normal without mass, normal turgor  Head and Face: examination of head and face revealed no abnormalities  Eyes: no lid or conjunctival swelling, erythema or discharge, pupils are normal, equal, round, reactive to light  Ears/Nose: external inspection of ears and nose revealed no abnormalities, hearing is grossly normal  Oropharynx/Mouth/Face: lips, tongue and gums are normal with no lesions, the voice quality was normal  Neck: neck is supple and symmetric, with midline trachea and no masses, thyroid is normal  Lymphatics: normal cervical lymph nodes, normal supraclavicular nodes  Pulmonary: no increased work of breathing or signs of respiratory distress, lungs are clear to auscultation  Cardiovascular: normal heart rate and rhythm, normal S1 and S2, no murmurs and pedal pulses and 2+ bilaterally, No edema  Abdomen: abdomen is soft, non-tender with no masses  Musculoskeletal: normal gait and station and exam of the digits and nails are normal  Neurological: normal coordination and normal general cortical function      Lab Review:    Lab Results   Component Value Date    WBC 4.6 08/29/2019    HGB 13.3 08/29/2019    HCT 38.6 08/29/2019    MCV 90.9 08/29/2019     08/29/2019     Lab Results   Component Value Date     08/29/2019    K 4.3 08/29/2019     08/29/2019    CO2 24 08/29/2019    BUN 9 08/29/2019    CREATININE 0.6 08/29/2019    GLUCOSE 88 08/29/2019    CALCIUM 9.1 08/29/2019    PROT 7.1 08/29/2019    PROT 7.4 01/27/2012    LABALBU 4.4 08/29/2019    BILITOT 0.4 08/29/2019    ALKPHOS 38 08/29/2019    AST 19 08/29/2019    ALT 10 08/29/2019    LABGLOM >60 08/29/2019    GFRAA >60 08/29/2019    GFRAA >60 01/27/2012    AGRATIO 1.6 08/29/2019    GLOB 2.7 08/29/2019     Lab Results   Component Value Date    TSH 3.43 08/29/2019     Lab Results   Component Value Date    LABA1C 5.4 08/29/2019     Lab Results   Component Value Date    .3 08/29/2019     Lab Results   Component Value Date    CHOL 253 08/29/2019     Lab Results   Component Value Date    TRIG 62 08/29/2019     Lab Results   Component Value Date    HDL 80 08/29/2019    HDL 61 01/27/2012     Lab Results   Component Value Date    LDLCALC 161 08/29/2019     Lab Results   Component Value Date    LABVLDL 12 08/29/2019     No results found for: CHOLHDLRATIO  No results found for: Shira Young  Lab Results   Component Value Date    VITD25 42.5 08/30/2018        ASSESSMENT/PLAN:  1. Age-related osteoporosis without current pathological fracture  Continue Prolia  Obtain CMP and 25-hydroxy vitamin D now and prior to next appointment  - Comprehensive Metabolic Panel; Future  - Vitamin D 25 Hydroxy; Future  - Comprehensive Metabolic Panel; Future  - Vitamin D 25 Hydroxy;  Future  Osteoporosis since 2015  Risk factors strong family history of osteoporosis, early menopause, steroid exposure  24-hour urine for calcium high normal  Continue calcium and vitamin D in diet  Continue Prolia since 1/2016  DEXA scan 2017 T score -2.1, left femoral neck -2.6, left hip -1.4  Vitamin D deficiency 25 hydroxy vitamin D 42.5    Reviewed and/or ordered clinical lab results Yes  Reviewed and/or ordered radiology tests Yes   Reviewed and/or ordered other diagnostic tests No  Discussed test results with performing physician No  Independently reviewed image, tracing, or specimen No  Made a decision to obtain old records No  Reviewed and summarized old records Yes   Osteoporosis since 2015  Risk factors strong family history of osteoporosis, early menopause, steroid exposure  24-hour urine for calcium high normal  Continue calcium and vitamin D in diet  Continue Prolia since 1/2016  DEXA scan 2017 T score -2.1, left femoral neck -2.6, left hip -1.4  Vitamin D deficiency 25 hydroxy vitamin D 42.5  Obtained history from other than patient No    Elvie Cristobal was counseled regarding symptoms of osteoporosis diagnosis, course and complications of disease if inadequately treated, side effects of medications, diagnosis, treatment options, and prognosis, risks, benefits, complications, and alternatives of treatment, labs, imaging and other studies and treatment targets and goals, Prolia, side effects, benefits, risk of fracture. She understands instructions and counseling. Total time I spent for this encounter 30 minutes    Return in about 1 year (around 5/20/2022) for osteoporosis.     Electronically signed by Roxie Chaves MD on 5/25/2021 at 12:06 AM

## 2021-05-20 NOTE — LETTER
[facility]  [place] [address] [address] [address]  Phone: [phone]  Fax: [phone]    Paz Sultana MD    May 25, 2021     Alondra Salas MD  [facility] [address] [facility] [address]    Patient: Gomez Gunn   MR Number: [id number]   YOB: 1960   Date of Visit: 5/20/2021       Dear Alondra Salas:    Thank you for referring Tatyana Sharma to me for evaluation/treatment. Below are the relevant portions of my assessment and plan of care. If you have questions, please do not hesitate to call me. I look forward to following Isauro Cuellar along with you.     Sincerely,        Paz Sultana MD

## 2021-11-15 ENCOUNTER — TELEPHONE (OUTPATIENT)
Dept: ENDOCRINOLOGY | Age: 61
End: 2021-11-15

## 2021-11-15 NOTE — TELEPHONE ENCOUNTER
Pt called and inquired if everything was good to go for Prolia injection tmrw am.     Per last visit for Prolia, office supplied. IN media No PA required. Office has Prolia's in [facility]. Pt notified.

## 2021-11-16 ENCOUNTER — NURSE ONLY (OUTPATIENT)
Dept: ENDOCRINOLOGY | Age: 61
End: 2021-11-16
Payer: COMMERCIAL

## 2021-11-16 DIAGNOSIS — M81.0 AGE-RELATED OSTEOPOROSIS WITHOUT CURRENT PATHOLOGICAL FRACTURE: ICD-10-CM

## 2021-11-16 PROCEDURE — 96372 THER/PROPH/DIAG INJ SC/IM: CPT | Performed by: INTERNAL MEDICINE

## 2021-11-16 NOTE — PATIENT INSTRUCTIONS
Informed patient if any signs of redness,rash,swelling or unusual symptoms occur, please contact the office. Prolia given per physician order.     Office supplied

## 2022-05-19 ENCOUNTER — TELEPHONE (OUTPATIENT)
Dept: ENDOCRINOLOGY | Age: 62
End: 2022-05-19

## 2022-05-19 ENCOUNTER — NURSE ONLY (OUTPATIENT)
Dept: ENDOCRINOLOGY | Age: 62
End: 2022-05-19
Payer: MEDICARE

## 2022-05-19 DIAGNOSIS — M81.0 AGE-RELATED OSTEOPOROSIS WITHOUT CURRENT PATHOLOGICAL FRACTURE: Primary | ICD-10-CM

## 2022-05-19 PROCEDURE — 96372 THER/PROPH/DIAG INJ SC/IM: CPT | Performed by: INTERNAL MEDICINE

## 2022-05-19 NOTE — PROGRESS NOTES
Informed patient if any signs of redness,rash,swelling or unusual symptoms occur, please contact the office. Prolia given per physician order. Office supplied. Admin subq to [facility]. Informed pt that they needed labwork/dexascan/f/up appt. Pt scheduled next available.

## 2022-05-24 LAB
A/G RATIO: 1.4 (ref 1.2–2.2)
ALBUMIN SERPL-MCNC: 4.1 G/DL (ref 3.8–4.8)
ALP BLD-CCNC: 52 IU/L (ref 44–121)
ALT SERPL-CCNC: 6 IU/L (ref 0–32)
AST SERPL-CCNC: 19 IU/L (ref 0–40)
BILIRUB SERPL-MCNC: 0.3 MG/DL (ref 0–1.2)
BUN / CREAT RATIO: 14 (ref 12–28)
BUN BLDV-MCNC: 12 MG/DL (ref 8–27)
CALCIUM SERPL-MCNC: 8.7 MG/DL (ref 8.7–10.3)
CHLORIDE BLD-SCNC: 105 MMOL/L (ref 96–106)
CO2: 24 MMOL/L (ref 20–29)
CREAT SERPL-MCNC: 0.85 MG/DL (ref 0.57–1)
ESTIMATED GLOMERULAR FILTRATION RATE CREATININE EQUATION: 77 ML/MIN/1.73
GLOBULIN: 2.9 G/DL (ref 1.5–4.5)
GLUCOSE BLD-MCNC: 95 MG/DL (ref 65–99)
POTASSIUM SERPL-SCNC: 4.4 MMOL/L (ref 3.5–5.2)
SODIUM BLD-SCNC: 140 MMOL/L (ref 134–144)
TOTAL PROTEIN: 7 G/DL (ref 6–8.5)
VITAMIN D 25-HYDROXY: 34.3 NG/ML (ref 30–100)

## 2022-05-30 ENCOUNTER — TELEPHONE (OUTPATIENT)
Dept: ENDOCRINOLOGY | Age: 62
End: 2022-05-30

## 2023-02-09 NOTE — PROGRESS NOTES
DIAGNOSIS:  Left foot 2nd webspace Mortons's neuroma excision. DATE OF SURGERY:  1/18/2018. HISTORY OF PRESENT ILLNESS:  Ms. José Manuel Gould 59 y.o.  female who came in today for 6 weeks postoperative visit. The patient pain improvment. She has been WB on the heel in a post op shoe. Rates pain a 4/10 VAS mild achy with mild swelling. No improvement. Pain is worse with WB and better with rest and elevation. No numbness or tingling sensation. No fever or Chills. PHYSICAL EXAMINATION:  The incision healing well . There is ecchymosis on the sole of her foot. No signs of any erythema or drainage, minimal swelling. She has no pain with the active or passive range of motion of the left foot and toes, good ROM. She has intact sensation distally, and she is neurovascularly intact. Surgical pathology consistent with neuroma. IMPRESSION:  6 weeks out from left foot 2nd webspace Mortons's neuroma excision    PLAN: She was instructed to continue WB. No heavy impact activities. We recommended stretching exercises of the calf which was taught to the patient in the office today . The patient will come back for a follow up in 6 weeks. The patient understands that there is a chance of neuroma recurrence even after surgical excision.       Regina Rosado MD
[FreeTextEntry1] : referred to ENT

## 2023-09-04 NOTE — TELEPHONE ENCOUNTER
Agree with orders. Thank you.
Noted.
Pt came in for routine prolia injection. Pt has not been seen in a year now. Informed pt in order to continue w/ prolia she would need a f/up. Pt scheduled next available. Pt has not had recent labwork. Ordered CMP/Vitamin D. Pt also has not had dexa-scan done since 2020 under Dr Yolanda Ragland. Ordered dexa-scan. Pt will have everything done before August appt. Please advise if you want pt to have any additional labwork done.
rolling walker